# Patient Record
Sex: MALE | Race: WHITE | NOT HISPANIC OR LATINO | Employment: FULL TIME | URBAN - NONMETROPOLITAN AREA
[De-identification: names, ages, dates, MRNs, and addresses within clinical notes are randomized per-mention and may not be internally consistent; named-entity substitution may affect disease eponyms.]

---

## 2019-04-09 ENCOUNTER — HOSPITAL ENCOUNTER (EMERGENCY)
Facility: HOSPITAL | Age: 29
Discharge: HOME | End: 2019-04-10
Attending: EMERGENCY MEDICINE | Admitting: EMERGENCY MEDICINE

## 2019-04-09 ENCOUNTER — APPOINTMENT (OUTPATIENT)
Dept: CT IMAGING | Facility: HOSPITAL | Age: 29
End: 2019-04-09
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
## 2019-04-09 PROCEDURE — 72125 CT NECK SPINE W/O DYE: CPT

## 2019-04-09 PROCEDURE — 70450 CT HEAD/BRAIN W/O DYE: CPT

## 2019-04-09 PROCEDURE — 71260 CT THORAX DX C+: CPT

## 2019-04-09 PROCEDURE — 99283 EMERGENCY DEPT VISIT LOW MDM: CPT

## 2019-04-09 PROCEDURE — 25010000002 IOPAMIDOL 61 % SOLUTION: Performed by: EMERGENCY MEDICINE

## 2019-04-09 PROCEDURE — 72128 CT CHEST SPINE W/O DYE: CPT

## 2019-04-09 PROCEDURE — 74177 CT ABD & PELVIS W/CONTRAST: CPT

## 2019-04-09 RX ADMIN — IOPAMIDOL 100 ML: 612 INJECTION, SOLUTION INTRAVENOUS at 23:45

## 2019-04-10 VITALS
HEIGHT: 69 IN | WEIGHT: 280 LBS | RESPIRATION RATE: 16 BRPM | HEART RATE: 62 BPM | BODY MASS INDEX: 41.47 KG/M2 | OXYGEN SATURATION: 97 % | DIASTOLIC BLOOD PRESSURE: 61 MMHG | SYSTOLIC BLOOD PRESSURE: 128 MMHG | TEMPERATURE: 97.2 F

## 2019-04-10 PROCEDURE — 25010000002 IOPAMIDOL 61 % SOLUTION: Performed by: EMERGENCY MEDICINE

## 2019-04-10 RX ORDER — ETODOLAC 400 MG/1
400 TABLET, EXTENDED RELEASE ORAL DAILY
Qty: 10 TABLET | Refills: 0 | Status: SHIPPED | OUTPATIENT
Start: 2019-04-10 | End: 2019-04-24
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
## 2019-04-10 NOTE — ED NOTES
PT LOG ROLLED WITH STAFF AND MD SPARKS AT BEDSIDE FOR REMOVAL OF LONG SPINE BOARD.  C-COLLAR REMAINS IN PLACE.      Дмитрий Medrano RN  04/09/19 0996

## 2019-04-17 ENCOUNTER — TRANSCRIBE ORDERS (OUTPATIENT)
Dept: ADMINISTRATIVE | Facility: HOSPITAL | Age: 29
End: 2019-04-17

## 2019-04-17 DIAGNOSIS — R20.2 PARESTHESIA: ICD-10-CM

## 2019-04-17 DIAGNOSIS — M54.5 ACUTE LOW BACK PAIN, UNSPECIFIED BACK PAIN LATERALITY, WITH SCIATICA PRESENCE UNSPECIFIED: Primary | ICD-10-CM

## 2019-04-19 ENCOUNTER — HOSPITAL ENCOUNTER (OUTPATIENT)
Dept: CT IMAGING | Facility: HOSPITAL | Age: 29
Discharge: HOME | End: 2019-04-19
Admitting: PHYSICIAN ASSISTANT

## 2019-04-19 DIAGNOSIS — M54.5 ACUTE LOW BACK PAIN, UNSPECIFIED BACK PAIN LATERALITY, WITH SCIATICA PRESENCE UNSPECIFIED: ICD-10-CM

## 2019-04-19 DIAGNOSIS — R20.2 PARESTHESIA: ICD-10-CM

## 2019-04-19 PROCEDURE — 72131 CT LUMBAR SPINE W/O DYE: CPT

## 2019-05-03 ENCOUNTER — TRANSCRIBE ORDERS (OUTPATIENT)
Dept: ADMINISTRATIVE | Facility: HOSPITAL | Age: 29
End: 2019-05-03

## 2019-05-03 DIAGNOSIS — R20.2 PARESTHESIA OF SKIN: Primary | ICD-10-CM

## 2019-05-07 ENCOUNTER — HOSPITAL ENCOUNTER (OUTPATIENT)
Dept: NEUROLOGY | Facility: HOSPITAL | Age: 29
Discharge: HOME | End: 2019-05-07
Admitting: PHYSICIAN ASSISTANT

## 2019-05-07 DIAGNOSIS — R20.2 PARESTHESIA OF SKIN: ICD-10-CM

## 2019-05-07 PROCEDURE — 95886 MUSC TEST DONE W/N TEST COMP: CPT

## 2019-05-07 PROCEDURE — 95911 NRV CNDJ TEST 9-10 STUDIES: CPT

## 2019-09-03 ENCOUNTER — OFFICE VISIT (OUTPATIENT)
Dept: RETAIL CLINIC | Facility: CLINIC | Age: 29
End: 2019-09-03

## 2019-09-03 VITALS
OXYGEN SATURATION: 97 % | BODY MASS INDEX: 43.42 KG/M2 | DIASTOLIC BLOOD PRESSURE: 82 MMHG | HEART RATE: 81 BPM | TEMPERATURE: 97.9 F | SYSTOLIC BLOOD PRESSURE: 127 MMHG | WEIGHT: 294 LBS | RESPIRATION RATE: 16 BRPM

## 2019-09-03 DIAGNOSIS — M79.672 BILATERAL FOOT PAIN: ICD-10-CM

## 2019-09-03 DIAGNOSIS — M79.671 BILATERAL FOOT PAIN: ICD-10-CM

## 2019-09-03 DIAGNOSIS — L40.9 PSORIASIS: Primary | ICD-10-CM

## 2019-09-03 PROCEDURE — 99213 OFFICE O/P EST LOW 20 MIN: CPT | Performed by: NURSE PRACTITIONER

## 2019-09-03 NOTE — PROGRESS NOTES
Subjective   Jose Alejandro Edwards is a 29 y.o. male.     Acute exacerbation of rash to both feet.  Patient has psoriasis.  Hurts to stand on them for long      Rash   This is a recurrent problem. The current episode started in the past 7 days. The problem has been gradually worsening since onset. The affected locations include the left foot and right foot. The rash is characterized by dryness, pain, peeling, redness and scaling. Pertinent negatives include no fever or joint pain. Past treatments include nothing. (Psoriasis)        The following portions of the patient's history were reviewed and updated as appropriate: allergies, current medications, past family history, past medical history, past social history, past surgical history and problem list.    Review of Systems   Constitutional: Negative for chills and fever.   Musculoskeletal: Positive for gait problem. Negative for joint pain.   Skin: Positive for rash.       Objective   Physical Exam   Constitutional: He is oriented to person, place, and time. No distress.   obese   Cardiovascular: Normal rate.   Pulmonary/Chest: Effort normal.   Musculoskeletal:        Right ankle: Normal.        Left ankle: Normal.        Right foot: There is tenderness.        Left foot: There is tenderness.   Neurological: He is alert and oriented to person, place, and time. He has normal strength. No sensory deficit. Gait normal.   Skin: Rash noted.   Thick cracked scaling skin to feet bilateral, fissures to feet laterally; no active bleeding; no signs of acute infection         Assessment/Plan   Jose Alejandro was seen today for rash.    Diagnoses and all orders for this visit:    Psoriasis    Bilateral foot pain    Other orders  -     betamethasone valerate (VALISONE) 0.1 % ointment; Apply  topically to the appropriate area as directed 2 (Two) Times a Day As Needed for Rash.      Discussed appropriate care of feet. Return to see your Primary Care Provider if not improved in 1  week.    NOTE:  Patient is self pay.    JUDE Yarbrough

## 2019-09-03 NOTE — PATIENT INSTRUCTIONS
Psoriasis    Psoriasis is a long-term (chronic) condition of skin inflammation. It occurs because your immune system causes skin cells to form too quickly. As a result, too many skin cells grow and create raised, red patches (plaques) that look silvery on your skin. Plaques may appear anywhere on your body. They can be any size or shape.  Psoriasis can come and go. The condition varies from mild to very severe. It cannot be passed from one person to another (not contagious).  What are the causes?  The cause of psoriasis is not known, but certain factors can make the condition worse. These include:  · Damage or trauma to the skin, such as cuts, scrapes, sunburn, and dryness.  · Lack of sunlight.  · Certain medicines.  · Alcohol.  · Tobacco use.  · Stress.  · Infections caused by bacteria or viruses.  What increases the risk?  This condition is more likely to develop in:  · People with a family history of psoriasis.  · People who are .  · People who are between the ages of 15-30 and 50-60 years old.  What are the signs or symptoms?  There are five different types of psoriasis. You can have more than one type of psoriasis during your life. Types are:  · Plaque.  · Guttate.  · Inverse.  · Pustular.  · Erythrodermic.  Each type of psoriasis has different symptoms.  · Plaque psoriasis symptoms include red, raised plaques with a silvery white coating (scale). These plaques may be itchy. Your nails may be pitted and crumbly or fall off.  · Guttate psoriasis symptoms include small red spots that often show up on your trunk, arms, and legs. These spots may develop after you have been sick, especially with strep throat.  · Inverse psoriasis symptoms include plaques in your underarm area, under your breasts, or on your genitals, groin, or buttocks.  · Pustular psoriasis symptoms include pus-filled bumps that are painful, red, and swollen on the palms of your hands or the soles of your feet. You also may feel exhausted,  feverish, weak, or have no appetite.  · Erythrodermic psoriasis symptoms include bright red skin that may look burned. You may have a fast heartbeat and a body temperature that is too high or too low. You may be itchy or in pain.  How is this diagnosed?  Your health care provider may suspect psoriasis based on your symptoms and family history. Your health care provider will also do a physical exam. This may include a procedure to remove a tissue sample (biopsy) for testing. You may also be referred to a health care provider who specializes in skin diseases (dermatologist).  How is this treated?  There is no cure for this condition, but treatment can help manage it. Goals of treatment include:  · Helping your skin heal.  · Reducing itching and inflammation.  · Slowing the growth of new skin cells.  · Helping your immune system respond better to your skin.  Treatment varies, depending on the severity of your condition. Treatment may include:  · Creams or ointments.  · Ultraviolet ray exposure (light therapy). This may include natural sunlight or light therapy in a medical office.  · Medicines (systemic therapy). These medicines can help your body better manage skin cell turnover and inflammation. They may be used along with light therapy or ointments. You may also get antibiotic medicines if you have an infection.  Follow these instructions at home:  Skin Care  · Moisturize your skin as needed. Only use moisturizers that have been approved by your health care provider.  · Apply cool compresses to the affected areas.  · Do not scratch your skin.  Lifestyle    · Do not use tobacco products. This includes cigarettes, chewing tobacco, and e-cigarettes. If you need help quitting, ask your health care provider.  · Drink little or no alcohol.  · Try techniques for stress reduction, such as meditation or yoga.  · Get exposure to the sun as told by your health care provider. Do not get sunburned.  · Consider joining a  psoriasis support group.  Medicines  · Take or use over-the-counter and prescription medicines only as told by your health care provider.  · If you were prescribed an antibiotic, take or use it as told by your health care provider. Do not stop taking the antibiotic even if your condition starts to improve.  General instructions  · Keep a journal to help track what triggers an outbreak. Try to avoid any triggers.  · See a counselor or  if feelings of sadness, frustration, and hopelessness about your condition are interfering with your work and relationships.  · Keep all follow-up visits as told by your health care provider. This is important.  Contact a health care provider if:  · Your pain gets worse.  · You have increasing redness or warmth in the affected areas.  · You have new or worsening pain or stiffness in your joints.  · Your nails start to break easily or pull away from the nail bed.  · You have a fever.  · You feel depressed.  This information is not intended to replace advice given to you by your health care provider. Make sure you discuss any questions you have with your health care provider.  Document Released: 12/15/2001 Document Revised: 05/25/2017 Document Reviewed: 05/04/2016  Elsevier Interactive Patient Education © 2019 [employer]

## 2020-09-12 PROCEDURE — U0003 INFECTIOUS AGENT DETECTION BY NUCLEIC ACID (DNA OR RNA); SEVERE ACUTE RESPIRATORY SYNDROME CORONAVIRUS 2 (SARS-COV-2) (CORONAVIRUS DISEASE [COVID-19]), AMPLIFIED PROBE TECHNIQUE, MAKING USE OF HIGH THROUGHPUT TECHNOLOGIES AS DESCRIBED BY CMS-2020-01-R: HCPCS | Performed by: NURSE PRACTITIONER

## 2020-09-14 ENCOUNTER — TELEPHONE (OUTPATIENT)
Dept: URGENT CARE | Facility: CLINIC | Age: 30
End: 2020-09-14

## 2020-09-14 NOTE — TELEPHONE ENCOUNTER
Spoke with patient to discuss positive Covid-19 test results. He reports little to no symptoms at this time. Agree to quarantine for discussed period of time.

## 2021-02-05 ENCOUNTER — OFFICE VISIT (OUTPATIENT)
Dept: NEUROLOGY | Facility: CLINIC | Age: 31
End: 2021-02-05

## 2021-02-05 VITALS
BODY MASS INDEX: 45.1 KG/M2 | HEIGHT: 70 IN | WEIGHT: 315 LBS | HEART RATE: 74 BPM | SYSTOLIC BLOOD PRESSURE: 132 MMHG | RESPIRATION RATE: 20 BRPM | DIASTOLIC BLOOD PRESSURE: 80 MMHG

## 2021-02-05 DIAGNOSIS — G47.9 SLEEP DISTURBANCE: Primary | ICD-10-CM

## 2021-02-05 DIAGNOSIS — R06.83 SNORING: ICD-10-CM

## 2021-02-05 PROCEDURE — 99203 OFFICE O/P NEW LOW 30 MIN: CPT | Performed by: PSYCHIATRY & NEUROLOGY

## 2021-02-05 NOTE — PROGRESS NOTES
HISTORY: Patient seen for history of excessive daytime sleepiness and fatigue.  Patient snores significantly.  Patient wakes up gasping and has some morning headaches.  Patient has awaken with dry mouth and been told he quits breathing at night.  Patient is awake and gasping for breath.  Patient has trouble breathing through his nose at night.  Patient has periods where he seems to feel paralyzed from going to sleep.  Patient been told he moves/jerks during sleep and has problems initiating sleep at night and pain that bothers him at night.  Patient talks in his sleep and acts out his dreams.  Patient has excessive sweating during sleep.  Patient has gained some 70 pounds in the last year.  Patient goes to bed at 10 PM awakens at 7:30 AM.  Patient sometimes takes hours to fall asleep.  Patient on no medication.  Patient previously was using methamphetamines but at 2 years clean that.  Patient working in the rehab now.  Patient denies any underlying heart problems or lung problems.  Patient had previous sleep study at [facility] several years ago and that report is not available for review.  Patient never did get the results or follow-up on that.    EXAM: Patient's BMI is 46.2.  [place] Sleepiness Scale is 16.  Blood pressure to 132/80 left arm standing 124/72 left arm seated with pulse 74.  Neck circumference 20 inches.  Stop bang as 6.  No bruits/no lymphadenopathy.  No acute oropharynx abnormalities.  Mallampati 4.  No acute cardiopulmonary abnormalities by auscultation.  Abdomen nondistended with no liver edge or spleen edge felt.  No acute fundic abnormalities.  Tympanic membrane on the left obscured by wax.    IMPRESSION: Patient with probable sleep apnea and checking overnight continuous oximetry on room air as baseline plus either home study or in lab polysomnogram.  Patient to have safety and driving precautions as discussed.  Patient to be careful about climbing or using sharp cutting tools or working around  hot fire/stove/grill/water.  I spent 30 minutes with this patient counseling and reviewing records and exam.Patient's Body mass index is 46.2 kg/m². BMI is above normal parameters. Recommendations include: referral to primary care.

## 2021-02-05 NOTE — PATIENT INSTRUCTIONS
Patient to continue driving and safety precautions as discussed.  Patient to get with PCP evaluate diet control

## 2021-02-26 ENCOUNTER — TELEPHONE (OUTPATIENT)
Dept: NEUROLOGY | Facility: CLINIC | Age: 31
End: 2021-02-26

## 2021-03-01 NOTE — TELEPHONE ENCOUNTER
DELETE AFTER REVIEWING: Telephone encounter to be sent to the clinical pool   Hub staff attempted to follow warm transfer process and was unsuccessful     Caller: TERRY BARRAGAN    Relationship to patient: SELF    Best call back number: [phone]    Patient is needing: TO RETURN YOLI'S PHONE CALL. UNABLE TO WARM TRANSFER. PLEASE CALL BACK AT EARLIEST CONVENIENCE.        DELETE AFTER READING TO PATIENT: “I was unable to reach my scheduling contact. I will send a message to the scheduling team. Please allow 48 hours for the  staff to follow up on this request.”

## 2021-03-04 ENCOUNTER — DOCUMENTATION (OUTPATIENT)
Dept: NEUROLOGY | Facility: CLINIC | Age: 31
End: 2021-03-04

## 2021-03-04 NOTE — PROGRESS NOTES
Patient's overnight continuous oximetry came back at 26 minutes and 15 seconds less than or equal to 89% oxygen saturation 20 minutes and 43 seconds less than or equal to 88% oxygen saturation with lowest being 74%.  Patient HERBER was 15.3.  Lowest pulse 45 bpm.  Patient is to be started on oxygen at 1 L/min with overnight continuous oximetry on the oxygen to be done.  Patient sleep study will be changed from home sleep study to in lab be sleep study.

## 2021-03-04 NOTE — TELEPHONE ENCOUNTER
TERRY BARRAGAN  [phone]    THE PT IS WANTING TO KNOW WHAT HIS NEXT STEP SHOULD BE IN ORDER TO GET HIS CPAP MACHINE. PLEASE GIVE HIM A CALL BACK WHEN POSSIBLE.

## 2021-03-05 ENCOUNTER — TELEPHONE (OUTPATIENT)
Dept: NEUROLOGY | Facility: CLINIC | Age: 31
End: 2021-03-05

## 2021-03-05 ENCOUNTER — DOCUMENTATION (OUTPATIENT)
Dept: NEUROLOGY | Facility: CLINIC | Age: 31
End: 2021-03-05

## 2021-03-05 NOTE — TELEPHONE ENCOUNTER
----- Message from Edward Enamorado MD sent at 3/5/2021  3:13 PM CST -----  Contact DME to fax them the note that I dictated about the patient having hypoxia and contact the patient to note to the patient that patient is being scheduled for his in lab sleep study  ----- Message -----  From: Crissy Martini LPN  Sent: 3/5/2021   3:05 PM CST  To: Edward Enamorado MD    [facility] with [facility] said Jose Alejandro needs a diagnosis of hypoxia before insurance will cover the oxygen.

## 2021-03-05 NOTE — PROGRESS NOTES
Patient has the diagnosis of hypoxia and is going to be started on nasal oxygen by nasal cannula nightly

## 2021-03-08 DIAGNOSIS — R06.83 SNORING: ICD-10-CM

## 2021-03-08 DIAGNOSIS — G47.9 SLEEP DISTURBANCE: Primary | ICD-10-CM

## 2021-03-08 DIAGNOSIS — G47.30 SLEEP APNEA, UNSPECIFIED TYPE: ICD-10-CM

## 2021-06-21 ENCOUNTER — HOSPITAL ENCOUNTER (OUTPATIENT)
Dept: SLEEP MEDICINE | Facility: HOSPITAL | Age: 31
Discharge: HOME | End: 2021-06-21
Admitting: PSYCHIATRY & NEUROLOGY

## 2021-06-21 VITALS
WEIGHT: 315 LBS | HEART RATE: 80 BPM | RESPIRATION RATE: 16 BRPM | BODY MASS INDEX: 45.1 KG/M2 | HEIGHT: 70 IN | OXYGEN SATURATION: 93 % | DIASTOLIC BLOOD PRESSURE: 75 MMHG | SYSTOLIC BLOOD PRESSURE: 124 MMHG

## 2021-06-21 DIAGNOSIS — R06.83 SNORING: ICD-10-CM

## 2021-06-21 DIAGNOSIS — G47.9 SLEEP DISTURBANCE: ICD-10-CM

## 2021-06-21 DIAGNOSIS — G47.30 SLEEP APNEA, UNSPECIFIED TYPE: ICD-10-CM

## 2021-06-21 PROCEDURE — 95810 POLYSOM 6/> YRS 4/> PARAM: CPT | Performed by: PSYCHIATRY & NEUROLOGY

## 2021-06-21 PROCEDURE — 95810 POLYSOM 6/> YRS 4/> PARAM: CPT

## 2021-06-24 ENCOUNTER — TRANSCRIBE ORDERS (OUTPATIENT)
Dept: SLEEP MEDICINE | Facility: HOSPITAL | Age: 31
End: 2021-06-24

## 2021-06-24 ENCOUNTER — TELEPHONE (OUTPATIENT)
Dept: NEUROLOGY | Facility: HOSPITAL | Age: 31
End: 2021-06-24

## 2021-06-24 DIAGNOSIS — G47.33 OBSTRUCTIVE SLEEP APNEA, ADULT: Primary | ICD-10-CM

## 2021-06-24 NOTE — TELEPHONE ENCOUNTER
Spoke to Mr. Edwards and went over the results of his polysomnogram performed 06/21/2021.  Questions answered.  Dr. Edward Enamordao wrote an order for Mr. Edwards to be scheduled for a CPAP trial.  The sleep center will call to schedule.  Mr. Edwards understands the sleep center is booking in-lab studies out in August.

## 2021-07-01 ENCOUNTER — TRANSCRIBE ORDERS (OUTPATIENT)
Dept: LAB | Facility: HOSPITAL | Age: 31
End: 2021-07-01

## 2021-07-01 DIAGNOSIS — Z01.818 PREOPERATIVE TESTING: Primary | ICD-10-CM

## 2021-07-05 ENCOUNTER — LAB (OUTPATIENT)
Dept: LAB | Facility: HOSPITAL | Age: 31
End: 2021-07-05

## 2021-07-05 LAB — SARS-COV-2 ORF1AB RESP QL NAA+PROBE: NOT DETECTED

## 2021-07-05 PROCEDURE — U0004 COV-19 TEST NON-CDC HGH THRU: HCPCS | Performed by: PSYCHIATRY & NEUROLOGY

## 2021-07-05 PROCEDURE — C9803 HOPD COVID-19 SPEC COLLECT: HCPCS | Performed by: PSYCHIATRY & NEUROLOGY

## 2021-07-06 ENCOUNTER — HOSPITAL ENCOUNTER (OUTPATIENT)
Dept: SLEEP MEDICINE | Facility: HOSPITAL | Age: 31
Discharge: HOME | End: 2021-07-06

## 2021-07-15 ENCOUNTER — TRANSCRIBE ORDERS (OUTPATIENT)
Dept: LAB | Facility: HOSPITAL | Age: 31
End: 2021-07-15

## 2021-07-15 DIAGNOSIS — Z01.818 PREOPERATIVE TESTING: Primary | ICD-10-CM

## 2021-07-16 ENCOUNTER — LAB (OUTPATIENT)
Dept: LAB | Facility: HOSPITAL | Age: 31
End: 2021-07-16

## 2021-07-16 LAB — SARS-COV-2 ORF1AB RESP QL NAA+PROBE: NOT DETECTED

## 2021-07-16 PROCEDURE — U0004 COV-19 TEST NON-CDC HGH THRU: HCPCS | Performed by: PSYCHIATRY & NEUROLOGY

## 2021-07-16 PROCEDURE — C9803 HOPD COVID-19 SPEC COLLECT: HCPCS | Performed by: PSYCHIATRY & NEUROLOGY

## 2021-07-19 ENCOUNTER — HOSPITAL ENCOUNTER (OUTPATIENT)
Dept: SLEEP MEDICINE | Facility: HOSPITAL | Age: 31
Discharge: HOME | End: 2021-07-19
Admitting: PSYCHIATRY & NEUROLOGY

## 2021-07-19 VITALS
HEIGHT: 70 IN | OXYGEN SATURATION: 97 % | RESPIRATION RATE: 16 BRPM | DIASTOLIC BLOOD PRESSURE: 72 MMHG | HEART RATE: 93 BPM | WEIGHT: 315 LBS | BODY MASS INDEX: 45.1 KG/M2 | SYSTOLIC BLOOD PRESSURE: 123 MMHG

## 2021-07-19 DIAGNOSIS — G47.33 OBSTRUCTIVE SLEEP APNEA, ADULT: ICD-10-CM

## 2021-07-19 PROCEDURE — 95811 POLYSOM 6/>YRS CPAP 4/> PARM: CPT

## 2021-07-19 PROCEDURE — 95811 POLYSOM 6/>YRS CPAP 4/> PARM: CPT | Performed by: PSYCHIATRY & NEUROLOGY

## 2021-07-22 ENCOUNTER — TELEPHONE (OUTPATIENT)
Dept: NEUROLOGY | Facility: CLINIC | Age: 31
End: 2021-07-22

## 2021-07-22 NOTE — TELEPHONE ENCOUNTER
Provider: KENN  Caller: PT  Relationship to Patient: SELF  Pharmacy: N/A  Phone Number: [phone]  Reason for Call: PT TEL RE: CPAP MACHINE; WHEN SHOULD HE EXPECT TO GET IT AS HE COMPLETED SLEEP STUDY ON 7/19/21.    PLEASE CALL PT & ADVISE.    THANK YOU.

## 2021-07-22 NOTE — TELEPHONE ENCOUNTER
ADVISED PT OF THE LENGTH OF TIME FOR STUDY TO BE COMPLETED AS WELL AS LETTING HIM KNOW HE WILL BE CONTACTED RE: CPAP MACHINE.    PT UNDERSTOOD.

## 2021-07-30 ENCOUNTER — TELEPHONE (OUTPATIENT)
Dept: NEUROLOGY | Facility: HOSPITAL | Age: 31
End: 2021-07-30

## 2021-07-30 NOTE — TELEPHONE ENCOUNTER
Spoke to Mr. Edwards and went over the results of his CPAP trial performed 07/19/2021.  Questions answered.  Mr Edwards received his CPAP from [facility].  Mr. Edwards wore his unit for the first time last night.  Per Mr. Edwards, he used the unit about 5 hours. Mr. Edwards has a compliance appointment with Dr. Enamorado on October 21st, 2021 at 11AM.

## 2021-08-29 PROCEDURE — U0004 COV-19 TEST NON-CDC HGH THRU: HCPCS | Performed by: NURSE PRACTITIONER

## 2021-10-21 ENCOUNTER — OFFICE VISIT (OUTPATIENT)
Dept: NEUROLOGY | Facility: CLINIC | Age: 31
End: 2021-10-21

## 2021-10-21 VITALS
OXYGEN SATURATION: 95 % | BODY MASS INDEX: 46.65 KG/M2 | SYSTOLIC BLOOD PRESSURE: 126 MMHG | HEART RATE: 89 BPM | DIASTOLIC BLOOD PRESSURE: 76 MMHG | HEIGHT: 69 IN | WEIGHT: 315 LBS

## 2021-10-21 DIAGNOSIS — G47.33 OBSTRUCTIVE SLEEP APNEA: Primary | ICD-10-CM

## 2021-10-21 PROCEDURE — 99213 OFFICE O/P EST LOW 20 MIN: CPT | Performed by: PSYCHIATRY & NEUROLOGY

## 2021-10-21 NOTE — PROGRESS NOTES
HISTORY: Patient with HARSH on CPAP and 10 cm water pressure.  Patient uses a nasal mask and [facility].  He is in compliance with 70% of the time greater than equal to 4 hours averaging about 5 hours and a half.  Minimal leak with his mask/ patient did not require oxygen supplementation.  Stop bang is 3.  [place] Sleepiness Scale 9.  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  Again I reviewed safety and driving precautions.    EXAM: Patient's BMI is 49.09.  Neck circumference is 19.5 inches.  Blood pressure today 122/82 left arm seated with pulse 83 and standing 126/76 with pulse 89.  No acute oropharynx abnormalities.  Mallampati 3.  No bruits/no lymphadenopathy.  Abdomen nondistended and no liver edge or spleen is felt.  No acute cardiopulmonary abnormalities by auscultation.  No acute finding abnormalities noted.  Patient denies any headaches.  Tympanic membranes are normal    IMPRESSION: HARSH stable with [employer]Med device.  Patient to get with PCP about weight and diet control with elevated BMI.  I spent 20 minutes with this patient with review of records and exam and counseling.

## 2021-10-21 NOTE — PATIENT INSTRUCTIONS
Continue safety and driving precautions as previously discussed.  Patient to get with PCP about weight and diet control

## 2022-01-11 PROCEDURE — U0004 COV-19 TEST NON-CDC HGH THRU: HCPCS | Performed by: NURSE PRACTITIONER

## 2023-01-30 ENCOUNTER — OFFICE VISIT (OUTPATIENT)
Dept: NEUROLOGY | Facility: CLINIC | Age: 33
End: 2023-01-30
Payer: COMMERCIAL

## 2023-01-30 VITALS
HEART RATE: 88 BPM | DIASTOLIC BLOOD PRESSURE: 88 MMHG | SYSTOLIC BLOOD PRESSURE: 140 MMHG | WEIGHT: 315 LBS | OXYGEN SATURATION: 98 % | BODY MASS INDEX: 46.65 KG/M2 | HEIGHT: 69 IN

## 2023-01-30 DIAGNOSIS — G47.33 OSA (OBSTRUCTIVE SLEEP APNEA): Primary | ICD-10-CM

## 2023-01-30 PROCEDURE — 99213 OFFICE O/P EST LOW 20 MIN: CPT | Performed by: PSYCHIATRY & NEUROLOGY

## 2023-01-30 NOTE — PATIENT INSTRUCTIONS
Patient to continue driving and safety precautions as discussed.  Patient to get with PCP about weight and diet control with elevated BMI

## 2023-01-30 NOTE — PROGRESS NOTES
HISTORY: Patient is here for yearly visit for sleep follow-up.  His original AHI was 55 with hypoxemia.  Patient titrated to a CPAP of 10 and he is 100% compliant wearing it more than 8.5 hours a night.  His AHI is 1.4.  He is mask leak is minimal.  He uses [facility] as his DME.  He has a nasal mask.  He says he does not take any medications at this time.  Apparently did use methamphetamines at one point but not now.  Patient is not a smoker now and no alcohol use.  Patient follows with [facility] at [phone].  Apparently she follows blood work.  He has a STOP-BANG of 3 with neck circumference of 21 inches.  He has an [place] Sleepiness Scale of 14.  His previous [place] Sleepiness Scale was 9.  Patient had COVID late 2021 or possibly early 2022.  He says it he did not have any significant residual.  Patient says he is going to ask his PCP about possible referral for gastric sleeve.  Patient denies any problems driving.  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    EXAM: Blood pressure today 138/80 sitting with pulse of 83 and standing blood pressure 140/88 with pulse of 88.  BMI is 48.73.  Patient apparently has gained about 10 pounds since his original procedure.  Patient has no acute fundic abnormalities.  No tympanic membrane abnormalities.  No acute oropharynx abnormalities.  Mallampati 3.  No bruits/no lymphadenopathy.  No acute cardiopulmonary abnormalities by auscultation.    IMPRESSION: Patient has excellent compliance and low AHI but continues to have high [place] Sleepiness Scale.  I would like to review his blood work as a baseline.  Also want to have [facility] do an overnight continuous oximetry on the Pap device and have them check it also to make sure it is working properly.  Patient to get with PCP about weight and diet control with elevated BMI.  Patient to continue driving and safety precautions.  Patient to have caution climbing/ using sharp cutting tools and  working around hot fire/stove/grill/water.  I spent 20 minutes with this patient with counseling exam and review of records.  Patient might be a candidate for Provigil or Sunosi depending on the decision of the decision on surgery for gastric sleeve

## 2023-02-09 ENCOUNTER — TELEPHONE (OUTPATIENT)
Dept: NEUROLOGY | Facility: CLINIC | Age: 33
End: 2023-02-09
Payer: COMMERCIAL

## 2023-02-09 DIAGNOSIS — G47.33 OBSTRUCTIVE SLEEP APNEA: Primary | ICD-10-CM

## 2023-02-09 NOTE — TELEPHONE ENCOUNTER
PATIENTS WIFE CALLING TODAY TO ADVISE THAT [facility] NEEDS THE LATEST OV NOTES AND A PRESCRIPTION FOR CPAP AND SUPPLIES    PLEASE SEND TO [facility]    THANK YOU

## 2023-04-24 ENCOUNTER — TELEPHONE (OUTPATIENT)
Dept: NEUROLOGY | Facility: CLINIC | Age: 33
End: 2023-04-24
Payer: COMMERCIAL

## 2023-04-24 NOTE — TELEPHONE ENCOUNTER
Provider:BING  Caller: TERRY  Phone Number:[phone]  Reason for Call: PT CALLED AND IS NEEDING TO RESCHEDULE HIS APPT WITH  ON 04/28/23 . UNABLE TO RESCHEDULE PT. PLEASE ADVISE ON FOLLOW UP SLEEP APPT.    PLEASE REVIEW THANK YOU

## 2023-05-05 ENCOUNTER — DOCUMENTATION (OUTPATIENT)
Dept: NEUROLOGY | Facility: CLINIC | Age: 33
End: 2023-05-05
Payer: COMMERCIAL

## 2023-05-05 NOTE — PROGRESS NOTES
I had to leave a message for the patient.  I did remind him he has an appointment on May 9th at 2 PM with ..

## 2023-05-09 ENCOUNTER — OFFICE VISIT (OUTPATIENT)
Dept: NEUROLOGY | Facility: CLINIC | Age: 33
End: 2023-05-09
Payer: COMMERCIAL

## 2023-05-09 VITALS
HEART RATE: 92 BPM | BODY MASS INDEX: 46.65 KG/M2 | RESPIRATION RATE: 20 BRPM | SYSTOLIC BLOOD PRESSURE: 118 MMHG | WEIGHT: 315 LBS | HEIGHT: 69 IN | DIASTOLIC BLOOD PRESSURE: 78 MMHG

## 2023-05-09 DIAGNOSIS — G47.33 OSA ON CPAP: Primary | ICD-10-CM

## 2023-05-09 DIAGNOSIS — Z99.89 OSA ON CPAP: Primary | ICD-10-CM

## 2023-05-09 PROCEDURE — 99213 OFFICE O/P EST LOW 20 MIN: CPT | Performed by: PSYCHIATRY & NEUROLOGY

## 2023-05-09 PROCEDURE — 1160F RVW MEDS BY RX/DR IN RCRD: CPT | Performed by: PSYCHIATRY & NEUROLOGY

## 2023-05-09 PROCEDURE — 1159F MED LIST DOCD IN RCRD: CPT | Performed by: PSYCHIATRY & NEUROLOGY

## 2023-05-09 NOTE — PATIENT INSTRUCTIONS
Patient to get with PCP about elevated BMI and diet and weight control.  Patient to continue driving and safety precautions as discussed.

## 2023-05-09 NOTE — PROGRESS NOTES
HISTORY: Patient with history of severe HARSH with original AHI above 50.  Patient was significantly desaturating as well but was started on CPAP in July 2021.  Patient on 10 cm water pressure.  Patient is 100% compliant using it almost 9 hours per night.  He has a nasal mask through [facility] which is comfortable.  Minimal mask leak.  AHI 1.3.  Patient says he feels much better using the device and cannot go without them.  Patient works at [employer].  Patient's overnight oximetry on the Pap device is min 0 minutes less than or equal to 89 or 88%.  Patient is not using his a [place] fire as it caused water in his nose.    EXAM: Patient's blood pressure 120/80 left arm seated  118/78 left arm standing with pulse 92.  BMI is 53.02.  Neck circumference is 22 inches.  No acute oropharynx abnormalities.  Mallampati 3.  No acute cardiopulmonary abnormalities by auscultation.  No bruits/no lymphadenopathy.  Tympanic membranes are normal.  No acute fundic abnormalities.  Abdomen nondistended with no liver edge or spleen edge felt.    IMPRESSION: Patient with severe obstructive sleep apnea.  Patient stable at this point.  Patient to continue yearly follow-up with [facility] sleep clinic.  Patient has STOP-BANG of 4.  He says that he feels tired after hard day at work but not during work or driving.  Patient has an [place] Sleepiness Scale of 7.  I spent 20 minutes with this patient with counseling/ exam and review of records.  Patient to get with PCP about weight and diet control with elevated BMI.  Patient to continue driving and safety precautions as discussed.

## 2023-12-31 ENCOUNTER — HOSPITAL ENCOUNTER (EMERGENCY)
Facility: HOSPITAL | Age: 33
Discharge: HOME | End: 2023-12-31
Attending: EMERGENCY MEDICINE | Admitting: EMERGENCY MEDICINE
Payer: COMMERCIAL

## 2023-12-31 ENCOUNTER — APPOINTMENT (OUTPATIENT)
Dept: CT IMAGING | Facility: HOSPITAL | Age: 33
End: 2023-12-31
Payer: COMMERCIAL

## 2023-12-31 VITALS
WEIGHT: 315 LBS | TEMPERATURE: 97.7 F | OXYGEN SATURATION: 99 % | SYSTOLIC BLOOD PRESSURE: 142 MMHG | HEART RATE: 81 BPM | DIASTOLIC BLOOD PRESSURE: 84 MMHG | RESPIRATION RATE: 20 BRPM | BODY MASS INDEX: 46.65 KG/M2 | HEIGHT: 69 IN

## 2023-12-31 DIAGNOSIS — N20.0 KIDNEY STONE: Primary | ICD-10-CM

## 2023-12-31 DIAGNOSIS — R16.1 SPLENOMEGALY: ICD-10-CM

## 2023-12-31 LAB
ALBUMIN SERPL-MCNC: 4.2 G/DL (ref 3.5–5.2)
ALBUMIN/GLOB SERPL: 1 G/DL
ALP SERPL-CCNC: 78 U/L (ref 39–117)
ALT SERPL W P-5'-P-CCNC: 21 U/L (ref 1–41)
ANION GAP SERPL CALCULATED.3IONS-SCNC: 10 MMOL/L (ref 5–15)
AST SERPL-CCNC: 17 U/L (ref 1–40)
BACTERIA UR QL AUTO: ABNORMAL /HPF
BASOPHILS # BLD AUTO: 0.06 10*3/MM3 (ref 0–0.2)
BASOPHILS NFR BLD AUTO: 0.6 % (ref 0–1.5)
BILIRUB SERPL-MCNC: 0.4 MG/DL (ref 0–1.2)
BILIRUB UR QL STRIP: NEGATIVE
BUN SERPL-MCNC: 8 MG/DL (ref 6–20)
BUN/CREAT SERPL: 10.7 (ref 7–25)
CALCIUM SPEC-SCNC: 9.1 MG/DL (ref 8.6–10.5)
CHLORIDE SERPL-SCNC: 100 MMOL/L (ref 98–107)
CLARITY UR: CLEAR
CO2 SERPL-SCNC: 27 MMOL/L (ref 22–29)
COLOR UR: YELLOW
CREAT SERPL-MCNC: 0.75 MG/DL (ref 0.76–1.27)
DEPRECATED RDW RBC AUTO: 39.9 FL (ref 37–54)
EGFRCR SERPLBLD CKD-EPI 2021: 123.7 ML/MIN/1.73
EOSINOPHIL # BLD AUTO: 0.24 10*3/MM3 (ref 0–0.4)
EOSINOPHIL NFR BLD AUTO: 2.2 % (ref 0.3–6.2)
ERYTHROCYTE [DISTWIDTH] IN BLOOD BY AUTOMATED COUNT: 13.7 % (ref 12.3–15.4)
GLOBULIN UR ELPH-MCNC: 4.2 GM/DL
GLUCOSE SERPL-MCNC: 122 MG/DL (ref 65–99)
GLUCOSE UR STRIP-MCNC: NEGATIVE MG/DL
HCT VFR BLD AUTO: 42.4 % (ref 37.5–51)
HGB BLD-MCNC: 13.8 G/DL (ref 13–17.7)
HGB UR QL STRIP.AUTO: ABNORMAL
HYALINE CASTS UR QL AUTO: ABNORMAL /LPF
IMM GRANULOCYTES # BLD AUTO: 0.04 10*3/MM3 (ref 0–0.05)
IMM GRANULOCYTES NFR BLD AUTO: 0.4 % (ref 0–0.5)
KETONES UR QL STRIP: ABNORMAL
LEUKOCYTE ESTERASE UR QL STRIP.AUTO: ABNORMAL
LIPASE SERPL-CCNC: 25 U/L (ref 13–60)
LYMPHOCYTES # BLD AUTO: 2.3 10*3/MM3 (ref 0.7–3.1)
LYMPHOCYTES NFR BLD AUTO: 21.5 % (ref 19.6–45.3)
MCH RBC QN AUTO: 26.2 PG (ref 26.6–33)
MCHC RBC AUTO-ENTMCNC: 32.5 G/DL (ref 31.5–35.7)
MCV RBC AUTO: 80.6 FL (ref 79–97)
MONOCYTES # BLD AUTO: 0.5 10*3/MM3 (ref 0.1–0.9)
MONOCYTES NFR BLD AUTO: 4.7 % (ref 5–12)
NEUTROPHILS NFR BLD AUTO: 7.58 10*3/MM3 (ref 1.7–7)
NEUTROPHILS NFR BLD AUTO: 70.6 % (ref 42.7–76)
NITRITE UR QL STRIP: NEGATIVE
NRBC BLD AUTO-RTO: 0 /100 WBC (ref 0–0.2)
PH UR STRIP.AUTO: 5.5 [PH] (ref 5–8)
PLATELET # BLD AUTO: 367 10*3/MM3 (ref 140–450)
PMV BLD AUTO: 8.5 FL (ref 6–12)
POTASSIUM SERPL-SCNC: 3.7 MMOL/L (ref 3.5–5.2)
PROT SERPL-MCNC: 8.4 G/DL (ref 6–8.5)
PROT UR QL STRIP: ABNORMAL
RBC # BLD AUTO: 5.26 10*6/MM3 (ref 4.14–5.8)
RBC # UR STRIP: ABNORMAL /HPF
REF LAB TEST METHOD: ABNORMAL
SODIUM SERPL-SCNC: 137 MMOL/L (ref 136–145)
SP GR UR STRIP: 1.02 (ref 1–1.03)
SQUAMOUS #/AREA URNS HPF: ABNORMAL /HPF
UROBILINOGEN UR QL STRIP: ABNORMAL
WBC # UR STRIP: ABNORMAL /HPF
WBC NRBC COR # BLD AUTO: 10.72 10*3/MM3 (ref 3.4–10.8)

## 2023-12-31 PROCEDURE — 96375 TX/PRO/DX INJ NEW DRUG ADDON: CPT

## 2023-12-31 PROCEDURE — 80053 COMPREHEN METABOLIC PANEL: CPT | Performed by: EMERGENCY MEDICINE

## 2023-12-31 PROCEDURE — 83690 ASSAY OF LIPASE: CPT | Performed by: EMERGENCY MEDICINE

## 2023-12-31 PROCEDURE — 0 HYDROMORPHONE 1 MG/ML SOLUTION: Performed by: EMERGENCY MEDICINE

## 2023-12-31 PROCEDURE — 25010000002 KETOROLAC TROMETHAMINE PER 15 MG: Performed by: EMERGENCY MEDICINE

## 2023-12-31 PROCEDURE — 85025 COMPLETE CBC W/AUTO DIFF WBC: CPT | Performed by: EMERGENCY MEDICINE

## 2023-12-31 PROCEDURE — 74176 CT ABD & PELVIS W/O CONTRAST: CPT

## 2023-12-31 PROCEDURE — 25010000002 ONDANSETRON PER 1 MG: Performed by: EMERGENCY MEDICINE

## 2023-12-31 PROCEDURE — 96376 TX/PRO/DX INJ SAME DRUG ADON: CPT

## 2023-12-31 PROCEDURE — 25810000003 SODIUM CHLORIDE 0.9 % SOLUTION: Performed by: EMERGENCY MEDICINE

## 2023-12-31 PROCEDURE — 96374 THER/PROPH/DIAG INJ IV PUSH: CPT

## 2023-12-31 PROCEDURE — 99284 EMERGENCY DEPT VISIT MOD MDM: CPT

## 2023-12-31 PROCEDURE — 81001 URINALYSIS AUTO W/SCOPE: CPT | Performed by: EMERGENCY MEDICINE

## 2023-12-31 RX ORDER — KETOROLAC TROMETHAMINE 30 MG/ML
30 INJECTION, SOLUTION INTRAMUSCULAR; INTRAVENOUS ONCE
Status: COMPLETED | OUTPATIENT
Start: 2023-12-31 | End: 2023-12-31

## 2023-12-31 RX ORDER — OXYCODONE HYDROCHLORIDE AND ACETAMINOPHEN 5; 325 MG/1; MG/1
1 TABLET ORAL EVERY 8 HOURS PRN
Qty: 12 TABLET | Refills: 0 | Status: SHIPPED | OUTPATIENT
Start: 2023-12-31

## 2023-12-31 RX ORDER — ONDANSETRON 2 MG/ML
4 INJECTION INTRAMUSCULAR; INTRAVENOUS ONCE
Status: COMPLETED | OUTPATIENT
Start: 2023-12-31 | End: 2023-12-31

## 2023-12-31 RX ORDER — TAMSULOSIN HYDROCHLORIDE 0.4 MG/1
1 CAPSULE ORAL NIGHTLY
Qty: 14 CAPSULE | Refills: 0 | Status: SHIPPED | OUTPATIENT
Start: 2023-12-31

## 2023-12-31 RX ORDER — ONDANSETRON 4 MG/1
4 TABLET, ORALLY DISINTEGRATING ORAL EVERY 8 HOURS PRN
Qty: 12 TABLET | Refills: 0 | Status: SHIPPED | OUTPATIENT
Start: 2023-12-31

## 2023-12-31 RX ORDER — SODIUM CHLORIDE 0.9 % (FLUSH) 0.9 %
10 SYRINGE (ML) INJECTION AS NEEDED
Status: DISCONTINUED | OUTPATIENT
Start: 2023-12-31 | End: 2023-12-31 | Stop reason: HOSPADM

## 2023-12-31 RX ADMIN — KETOROLAC TROMETHAMINE 30 MG: 30 INJECTION, SOLUTION INTRAMUSCULAR; INTRAVENOUS at 11:00

## 2023-12-31 RX ADMIN — HYDROMORPHONE HYDROCHLORIDE 1 MG: 1 INJECTION, SOLUTION INTRAMUSCULAR; INTRAVENOUS; SUBCUTANEOUS at 10:58

## 2023-12-31 RX ADMIN — SODIUM CHLORIDE 1000 ML: 9 INJECTION, SOLUTION INTRAVENOUS at 10:58

## 2023-12-31 RX ADMIN — HYDROMORPHONE HYDROCHLORIDE 1 MG: 1 INJECTION, SOLUTION INTRAMUSCULAR; INTRAVENOUS; SUBCUTANEOUS at 12:56

## 2023-12-31 RX ADMIN — ONDANSETRON 4 MG: 2 INJECTION INTRAMUSCULAR; INTRAVENOUS at 11:00

## 2023-12-31 NOTE — ED PROVIDER NOTES
Subjective   History of Present Illness  Patient is a 33-year-old male with no significant past medical history who presents to the ER with abdominal pain.  Patient states he has had sharp right lower quadrant abdominal pain since this morning.  Patient states the pain was very sudden in onset.  He has had associated nausea and vomiting.  He has never had pain like this previously.  Nothing really makes his symptoms better or worse.  He denies any fever, chest pain, shortness of air, diarrhea, urinary changes, neurologic changes.      Review of Systems   Constitutional: Negative.    HENT: Negative.     Eyes: Negative.    Respiratory: Negative.     Cardiovascular: Negative.    Gastrointestinal:  Positive for abdominal pain, nausea and vomiting.   Endocrine: Negative.    Genitourinary: Negative.    Musculoskeletal: Negative.    Skin: Negative.    Allergic/Immunologic: Negative.    Neurological: Negative.    Hematological: Negative.    Psychiatric/Behavioral: Negative.     All other systems reviewed and are negative.      Past Medical History:   Diagnosis Date    Psoriasis        No Known Allergies    Past Surgical History:   Procedure Laterality Date    HERNIA REPAIR      TYMPANOSTOMY TUBE PLACEMENT         No family history on file.    Social History     Socioeconomic History    Marital status:    Tobacco Use    Smoking status: Former    Smokeless tobacco: Never   Vaping Use    Vaping Use: Never used   Substance and Sexual Activity    Alcohol use: No    Drug use: Not Currently     Types: Methamphetamines     Comment: IN DRUG COURT  past    Sexual activity: Defer           Objective   Physical Exam  Vitals and nursing note reviewed.   Constitutional:       Appearance: He is well-developed.   HENT:      Head: Normocephalic and atraumatic.   Eyes:      Conjunctiva/sclera: Conjunctivae normal.      Pupils: Pupils are equal, round, and reactive to light.   Cardiovascular:      Rate and Rhythm: Normal rate and  regular rhythm.      Heart sounds: Normal heart sounds.   Pulmonary:      Effort: Pulmonary effort is normal.      Breath sounds: Normal breath sounds.   Abdominal:      Palpations: Abdomen is soft.      Tenderness: There is abdominal tenderness in the right lower quadrant. There is no right CVA tenderness, left CVA tenderness, guarding or rebound.   Musculoskeletal:         General: No deformity. Normal range of motion.      Cervical back: Normal range of motion.   Skin:     General: Skin is warm.   Neurological:      Mental Status: He is alert and oriented to person, place, and time.   Psychiatric:         Behavior: Behavior normal.         Procedures           ED Course                                 Lab Results (last 24 hours)       Procedure Component Value Units Date/Time    CBC & Differential [[id number]]  (Abnormal) Collected: 12/31/23 1100    Specimen: Blood Updated: 12/31/23 1109    Narrative:      The following orders were created for panel order CBC & Differential.  Procedure                               Abnormality         Status                     ---------                               -----------         ------                     CBC Auto Differential[[id number]]        Abnormal            Final result                 Please view results for these tests on the individual orders.    Comprehensive Metabolic Panel [[id number]]  (Abnormal) Collected: 12/31/23 1100    Specimen: Blood Updated: 12/31/23 1126     Glucose 122 mg/dL      BUN 8 mg/dL      Creatinine 0.75 mg/dL      Sodium 137 mmol/L      Potassium 3.7 mmol/L      Chloride 100 mmol/L      CO2 27.0 mmol/L      Calcium 9.1 mg/dL      Total Protein 8.4 g/dL      Albumin 4.2 g/dL      ALT (SGPT) 21 U/L      AST (SGOT) 17 U/L      Alkaline Phosphatase 78 U/L      Total Bilirubin 0.4 mg/dL      Globulin 4.2 gm/dL      A/G Ratio 1.0 g/dL      BUN/Creatinine Ratio 10.7     Anion Gap 10.0 mmol/L      eGFR 123.7 mL/min/1.73     Narrative:      GFR Normal  >60  Chronic Kidney Disease <60  Kidney Failure <15      Lipase [[id number]]  (Normal) Collected: 12/31/23 1100    Specimen: Blood Updated: 12/31/23 1122     Lipase 25 U/L     CBC Auto Differential [[id number]]  (Abnormal) Collected: 12/31/23 1100    Specimen: Blood Updated: 12/31/23 1109     WBC 10.72 10*3/mm3      RBC 5.26 10*6/mm3      Hemoglobin 13.8 g/dL      Hematocrit 42.4 %      MCV 80.6 fL      MCH 26.2 pg      MCHC 32.5 g/dL      RDW 13.7 %      RDW-SD 39.9 fl      MPV 8.5 fL      Platelets 367 10*3/mm3      Neutrophil % 70.6 %      Lymphocyte % 21.5 %      Monocyte % 4.7 %      Eosinophil % 2.2 %      Basophil % 0.6 %      Immature Grans % 0.4 %      Neutrophils, Absolute 7.58 10*3/mm3      Lymphocytes, Absolute 2.30 10*3/mm3      Monocytes, Absolute 0.50 10*3/mm3      Eosinophils, Absolute 0.24 10*3/mm3      Basophils, Absolute 0.06 10*3/mm3      Immature Grans, Absolute 0.04 10*3/mm3      nRBC 0.0 /100 WBC     Urinalysis With Culture If Indicated - Urine, Clean Catch [[id number]]  (Abnormal) Collected: 12/31/23 1157    Specimen: Urine, Clean Catch Updated: 12/31/23 1230     Color, UA Yellow     Appearance, UA Clear     pH, UA 5.5     Specific Gravity, UA 1.024     Glucose, UA Negative     Ketones, UA Trace     Bilirubin, UA Negative     Blood, UA Large (3+)     Protein, UA Trace     Leuk Esterase, UA Small (1+)     Nitrite, UA Negative     Urobilinogen, UA 1.0 E.U./dL    Narrative:      In absence of clinical symptoms, the presence of pyuria, bacteria, and/or nitrites on the urinalysis result does not correlate with infection.    Urinalysis, Microscopic Only - Urine, Clean Catch [[id number]]  (Abnormal) Collected: 12/31/23 1157    Specimen: Urine, Clean Catch Updated: 12/31/23 1230     RBC, UA 11-20 /HPF      WBC, UA 3-5 /HPF      Comment: Urine culture not indicated.        Bacteria, UA None Seen /HPF      Squamous Epithelial Cells, UA 3-6 /HPF      Hyaline Casts, UA 0-2 /LPF      Methodology Manual Light  Microscopy           CT Abdomen Pelvis Stone Protocol   Final Result       2 mm calculus at the right UVJ with minimal upstream hydronephrosis.       Stable borderline splenomegaly.              This report was signed and finalized on 12/31/2023 11:24 AM by Jeffrey Burciaga.               CRISTINA reviewed by Graciela King MD       Medical Decision Making  Patient is a 33-year-old male with no significant past medical history who presents to the ER with abdominal pain.  Patient states he has had sharp right lower quadrant abdominal pain since this morning.  Patient states the pain was very sudden in onset.  He has had associated nausea and vomiting.  He has never had pain like this previously.  Nothing really makes his symptoms better or worse.  He denies any fever, chest pain, shortness of air, diarrhea, urinary changes, neurologic changes.    Differential diagnosis: Kidney stone, UTI, appendicitis    Patient was given IV fluids, Dilaudid, Toradol and Zofran.  Pain improved with treatment.  Labs were unremarkable.  Urinalysis showed hematuria but no evidence of infection.  CT scan of the abdomen pelvis showed a 2 mm calculus at the right UVJ with minimal upstream hydronephrosis.  Patient also has stable borderline splenomegaly.  Patient's pain was well-controlled as well as his nausea and vomiting.  He is appropriate for outpatient treatment.  He will be discharged home with a short course of Percocet for pain control as well as Flomax and Zofran to follow-up with his PCP and urology.  He is to return for any worse or new pain, fever, vomiting or other concerns.  Patient and wife agreeable to plan.    Problems Addressed:  Kidney stone: complicated acute illness or injury  Splenomegaly: complicated acute illness or injury    Amount and/or Complexity of Data Reviewed  Labs: ordered. Decision-making details documented in ED Course.  Radiology: ordered. Decision-making details documented in ED  Course.    Risk  Prescription drug management.        Final diagnoses:   Kidney stone   Splenomegaly       ED Disposition  ED Disposition       ED Disposition   Discharge    Condition   Good    Comment   --               Lisha Palumbo, APRN  [address] [id number] [phone]    Schedule an appointment as soon as possible for a visit       Rupert Encarnacion MD  [address]  [address]  [facility] [address]  [phone]    Schedule an appointment as soon as possible for a visit            Medication List        New Prescriptions      ondansetron ODT 4 MG disintegrating tablet  Commonly known as: ZOFRAN-ODT  Place 1 tablet on the tongue Every 8 (Eight) Hours As Needed for Nausea or Vomiting.     oxyCODONE-acetaminophen 5-325 MG per tablet  Commonly known as: PERCOCET  Take 1 tablet by mouth Every 8 (Eight) Hours As Needed for Moderate Pain.     tamsulosin 0.4 MG capsule 24 hr capsule  Commonly known as: FLOMAX  Take 1 capsule by mouth Every Night.               Where to Get Your Medications        These medications were sent to [facility]/pharmacy #[id number] - [place], [place] - [address] AT ACROSS FROM [address] - [phone] [facility] [phone]   [address] [facility] [address]      Phone: [phone]   ondansetron ODT 4 MG disintegrating tablet  oxyCODONE-acetaminophen 5-325 MG per tablet  tamsulosin 0.4 MG capsule 24 hr capsule            Graciela King MD  12/31/23 0561

## 2024-04-24 ENCOUNTER — HOSPITAL ENCOUNTER (EMERGENCY)
Facility: HOSPITAL | Age: 34
Discharge: HOME | End: 2024-04-24
Attending: FAMILY MEDICINE | Admitting: FAMILY MEDICINE
Payer: COMMERCIAL

## 2024-04-24 ENCOUNTER — APPOINTMENT (OUTPATIENT)
Dept: CT IMAGING | Facility: HOSPITAL | Age: 34
End: 2024-04-24
Payer: COMMERCIAL

## 2024-04-24 VITALS
OXYGEN SATURATION: 98 % | HEART RATE: 97 BPM | BODY MASS INDEX: 46.65 KG/M2 | WEIGHT: 315 LBS | HEIGHT: 69 IN | DIASTOLIC BLOOD PRESSURE: 85 MMHG | RESPIRATION RATE: 20 BRPM | SYSTOLIC BLOOD PRESSURE: 155 MMHG | TEMPERATURE: 97.9 F

## 2024-04-24 DIAGNOSIS — M62.830 LUMBAR PARASPINAL MUSCLE SPASM: ICD-10-CM

## 2024-04-24 DIAGNOSIS — M51.36 DEGENERATIVE DISC DISEASE, LUMBAR: Primary | ICD-10-CM

## 2024-04-24 DIAGNOSIS — E66.01 MORBID OBESITY: ICD-10-CM

## 2024-04-24 PROCEDURE — 72131 CT LUMBAR SPINE W/O DYE: CPT

## 2024-04-24 PROCEDURE — 25010000002 ORPHENADRINE CITRATE PER 60 MG: Performed by: FAMILY MEDICINE

## 2024-04-24 PROCEDURE — 25010000002 KETOROLAC TROMETHAMINE PER 15 MG: Performed by: FAMILY MEDICINE

## 2024-04-24 PROCEDURE — 99284 EMERGENCY DEPT VISIT MOD MDM: CPT

## 2024-04-24 PROCEDURE — 96372 THER/PROPH/DIAG INJ SC/IM: CPT

## 2024-04-24 RX ORDER — ORPHENADRINE CITRATE 30 MG/ML
60 INJECTION INTRAMUSCULAR; INTRAVENOUS ONCE
Status: COMPLETED | OUTPATIENT
Start: 2024-04-24 | End: 2024-04-24

## 2024-04-24 RX ORDER — DICLOFENAC SODIUM 75 MG/1
75 TABLET, DELAYED RELEASE ORAL 2 TIMES DAILY PRN
Qty: 60 TABLET | Refills: 0 | Status: SHIPPED | OUTPATIENT
Start: 2024-04-24 | End: 2024-05-24

## 2024-04-24 RX ORDER — TIZANIDINE HYDROCHLORIDE 4 MG/1
4 CAPSULE, GELATIN COATED ORAL 3 TIMES DAILY
Qty: 21 CAPSULE | Refills: 0 | Status: SHIPPED | OUTPATIENT
Start: 2024-04-24 | End: 2024-05-01

## 2024-04-24 RX ORDER — KETOROLAC TROMETHAMINE 30 MG/ML
60 INJECTION, SOLUTION INTRAMUSCULAR; INTRAVENOUS ONCE
Status: COMPLETED | OUTPATIENT
Start: 2024-04-24 | End: 2024-04-24

## 2024-04-24 RX ADMIN — KETOROLAC TROMETHAMINE 60 MG: 30 INJECTION, SOLUTION INTRAMUSCULAR; INTRAVENOUS at 16:51

## 2024-04-24 RX ADMIN — ORPHENADRINE CITRATE 60 MG: 60 INJECTION INTRAMUSCULAR; INTRAVENOUS at 16:51

## 2024-04-24 NOTE — ED PROVIDER NOTES
HPI:    Patient is a 34-year-old morbidly obese white male who presents to the emergency room with a complaint of having low back pain nontraumatic.  Patient states started 10:00 today while at work just sitting down.  He rates his pain 8 out of 10.  There is no loss of bowel or bladder control.  No fever chills night sweats no nausea vomiting diarrhea.  Patient states that this has not happened in the past.    REVIEW OF SYSTEMS  CONSTITUTIONAL:  No complaints of fever, chills,or weakness  EYES:  No complaints of discharge   ENT: No complaints of sore throat or ear pain  CARDIOVASCULAR:  No complaints of chest pain, palpitations, or swelling  RESPIRATORY:  No complaints of cough or shortness of breath  GI:  No complaints of abdominal pain, nausea, vomiting, or diarrhea  MUSCULOSKELETAL: Positive for 8 out of 10 low back pain nontraumatic   SKIN:  No complaints of rash  NEUROLOGIC:  No complaints of headache, focal weakness, or sensory changes  ENDOCRINE:  No complaints of polyuria or polydipsia  LYMPHATIC:  No complaints of swollen glands  GENITOURINARY: No complaints of urinary frequency or hematuria        PAST MEDICAL HISTORY  Past Medical History:   Diagnosis Date    Psoriasis        FAMILY HISTORY  History reviewed. No pertinent family history.    SOCIAL HISTORY  Social History     Socioeconomic History    Marital status:    Tobacco Use    Smoking status: Former    Smokeless tobacco: Never   Vaping Use    Vaping status: Never Used   Substance and Sexual Activity    Alcohol use: No    Drug use: Not Currently     Types: Methamphetamines     Comment: IN DRUG COURT  past    Sexual activity: Defer       IMMUNIZATION HISTORY  Deferred to primary care physician.    SURGICAL HISTORY  Past Surgical History:   Procedure Laterality Date    HERNIA REPAIR      TYMPANOSTOMY TUBE PLACEMENT         CURRENT MEDICATIONS  No current facility-administered medications for this encounter.    Current Outpatient Medications:     " albuterol sulfate  (90 Base) MCG/ACT inhaler, Inhale 2 puffs Every 6 (Six) Hours As Needed for Wheezing or Shortness of Air. (Patient not taking: Reported on 5/9/2023), Disp: 6.7 g, Rfl: 0    ondansetron ODT (ZOFRAN-ODT) 4 MG disintegrating tablet, Place 1 tablet on the tongue Every 8 (Eight) Hours As Needed for Nausea or Vomiting., Disp: 12 tablet, Rfl: 0    oxyCODONE-acetaminophen (PERCOCET) 5-325 MG per tablet, Take 1 tablet by mouth Every 8 (Eight) Hours As Needed for Moderate Pain., Disp: 12 tablet, Rfl: 0    tamsulosin (FLOMAX) 0.4 MG capsule 24 hr capsule, Take 1 capsule by mouth Every Night., Disp: 14 capsule, Rfl: 0    ALLERGIES  No Known Allergies    Musculoskeletal exam    VITAL SIGNS:   /85   Pulse 97   Temp 97.9 °F (36.6 °C)   Resp 20   Ht 175.3 cm (69\")   Wt (!) 170 kg (375 lb)   SpO2 98%   BMI 55.38 kg/m²     Constitutional: Patient is alert and in no distress.  Patient with moderate to severe low back discomfort.    Cardiovascular: S1-S2 regular rate and rhythm. No murmurs rubs or gallops are noted.    Respiratory: Patient is clear to auscultation bilaterally with no wheezing or rhonchi.  Chest wall is nontender.  There are no external lesions on the chest.  There is no crepitance    Abdomen: Soft, nontender. Bowel sounds are normal in all 4 quadrants. There is no rebound or guarding noted.  There is no abdominal distention or hepatosplenomegaly.      Back: Positive tenderness palpation of the lower lumbar spine and paralumbar musculature.  There is no step-off noted.  Straight leg raise causes pain but no radicular symptoms.    Musculoskeletal: No tenderness to palpation of the 4 extremities or the hips.    Integumentary: No acute changes noted    Genitourinary: Patient is voiding appropriately.    Psychiatric: Normal affect and mood      RADIOLOGY/PROCEDURES        CT Lumbar Spine Without Contrast   Final Result   1. Degenerative disc, endplate, and facet changes are greatest " at L5-S1.   2. Overall very similar appearance as compared with 5 years ago.       This report was signed and finalized on 4/24/2024 5:45 PM by Dr. Moris Schwarz MD.                 FUTURE APPOINTMENTS     No future appointments.         COURSE & MEDICAL DECISION MAKING    Patient's partial differential diagnosis can include:    Obesity causing degenerative disc, degenerative disc disease, lumbar strain, lumbar disc herniation, lumbar spondylolisthesis, lumbar spondylosis, lumbar spinal stenosis, lumbar strain, lumbar spasm, lumbar compression fracture, and others           Pain mildly improved at IM Toradol and Norflex.  Patient has no acute findings.  Lumbar skin essentially same as it was 5 years ago.      Patient's level of risk: Low      CRITICAL CARE    CRITICAL CARE: No    CRITICAL CARE TIME: None      No results found for this or any previous visit (from the past 24 hour(s)).         Also  Old charts were reviewed per [facility] EMR.  Pertinent details are summarized above.  All laboratory, radiologic, and EKG studies that were performed in the Emergency Department were a necessary part of the evaluation needed to exclude unstable or  emergent medical conditions.     Patient was hemodynamically and neurologically stable in the ED.   Pertinent studies were reviewed as above.     The patient received:  Medications   ketorolac (TORADOL) injection 60 mg (60 mg Intramuscular Given 4/24/24 1651)   orphenadrine (NORFLEX) injection 60 mg (60 mg Intramuscular Given 4/24/24 1651)            ED Disposition       ED Disposition   Discharge    Condition   Stable    Comment   --                 Dragon disclaimer:  Part of this note may be an electronic transcription/translation of spoken language to printed text using the Dragon Dictation System.     I have reviewed the patient’s prescription history via a prescription monitoring program.  This information is consistent with my knowledge of the patient’s controlled substance  use history.    Patient evaluated during Coronavirus Pandemic. Isolation practices followed according to [facility] policy.    FINAL IMPRESSION   Diagnosis Plan   1. Degenerative disc disease, lumbar        2. Morbid obesity        3. Lumbar paraspinal muscle spasm              MD Shemar Lott Jr, Thomas Mark Jr., MD  04/24/24 2832

## 2024-05-15 ENCOUNTER — OFFICE VISIT (OUTPATIENT)
Dept: NEUROLOGY | Age: 34
End: 2024-05-15
Payer: COMMERCIAL

## 2024-05-15 VITALS
SYSTOLIC BLOOD PRESSURE: 136 MMHG | DIASTOLIC BLOOD PRESSURE: 74 MMHG | HEART RATE: 79 BPM | HEIGHT: 69 IN | WEIGHT: 315 LBS | OXYGEN SATURATION: 98 % | BODY MASS INDEX: 46.65 KG/M2

## 2024-05-15 DIAGNOSIS — Z99.89 CPAP (CONTINUOUS POSITIVE AIRWAY PRESSURE) DEPENDENCE: ICD-10-CM

## 2024-05-15 DIAGNOSIS — Z76.89 ESTABLISHING CARE WITH NEW DOCTOR, ENCOUNTER FOR: ICD-10-CM

## 2024-05-15 DIAGNOSIS — G47.33 OBSTRUCTIVE SLEEP APNEA: Primary | ICD-10-CM

## 2024-05-15 PROCEDURE — 99213 OFFICE O/P EST LOW 20 MIN: CPT | Performed by: PHYSICIAN ASSISTANT

## 2024-05-15 RX ORDER — DICLOFENAC SODIUM 75 MG/1
75 TABLET, DELAYED RELEASE ORAL 2 TIMES DAILY PRN
COMMUNITY
Start: 2024-04-24 | End: 2024-05-25

## 2024-05-15 RX ORDER — TIZANIDINE HYDROCHLORIDE 4 MG/1
CAPSULE, GELATIN COATED ORAL
COMMUNITY
Start: 2024-04-24

## 2024-05-15 RX ORDER — ALBUTEROL SULFATE 90 UG/1
2 AEROSOL, METERED RESPIRATORY (INHALATION) EVERY 6 HOURS PRN
COMMUNITY
Start: 2023-02-28

## 2024-05-15 NOTE — PROGRESS NOTES
REVIEW OF SYSTEMS    Constitutional: []Fever []Sweats []Chills [] Recent Injury [x] Denies all unless marked  HEENT:[]Headache  [] Head Injury [] Hearing Loss  [] Sore Throat  [] Ear Ache [x] Denies all unless marked  Spine:  [] Neck pain  [] Back pain  [] Sciaticia  [x] Denies all unless marked  Cardiovascular:[]Heart Disease []Palpitations [] Chest Pain   [x] Denies all unless marked  Pulmonary: []Shortness of Breath []Cough   [x] Denies all unless marked  Psychiatric/Behavioral:[] Depression [] Anxiety [x] Denies all unless marked  Gastrointestinal: []Nausea  []Vomiting  []Abdominal Pain  []Constipation  []Diarrhea  [x] Denies all unless marked  Genitourinary:   [] Frequency  [] Urgency  [] Dysuria [] Incontinence  [x] Denies all unless marked  Extremities: []Pain  []Swelling  [x] Denies all unless marked  Musculoskeletal: [] Myalgias  [] Joint Pain  [] Arthritis [] Muscle Cramps [] Muscle Twitches  [x] Denies all unless marked  Sleep: []Insomnia[]Snoring []Restless Legs  [x]Sleep Apnea  []Daytime Sleepiness  [x] Denies all unless marked  Skin:[] Rash [] Color Change [x] Denies all unless marked   Neurological:[]Visual Disturbance [] Memory Loss []Loss of Balance []Slurred Speech []Weakness []Seizures  [] Dizziness [x] Denies all unless marked      
degenerative disc disease     Sleep apnea        Surgical History:  Past Surgical History:   Procedure Laterality Date    HERNIA REPAIR      TYMPANOSTOMY TUBE PLACEMENT         Current Medications:  Current Outpatient Medications   Medication Sig Dispense Refill    albuterol sulfate HFA (PROVENTIL;VENTOLIN;PROAIR) 108 (90 Base) MCG/ACT inhaler Inhale 2 puffs into the lungs every 6 hours as needed      diclofenac (VOLTAREN) 75 MG EC tablet Take 1 tablet by mouth 2 times daily as needed      tiZANidine (ZANAFLEX) 4 MG capsule TAKE 1 CAPSULE BY MOUTH 3 TIMES A DAY FOR 7 DAYS.       No current facility-administered medications for this visit.       Allergies:  Patient has no known allergies.    SOCIAL HISTORY:   Social History     Substance and Sexual Activity   Alcohol Use Not Currently     Social History     Substance and Sexual Activity   Drug Use Yes    Types: Marijuana (Weed), Methamphetamines (Crystal Meth), Cocaine     Social History     Tobacco Use   Smoking Status Former    Current packs/day: 0.25    Types: Cigarettes   Smokeless Tobacco Former    Types: Chew    Quit date: 2015       FAMILY HISTORY:   Family History   Problem Relation Age of Onset    Asthma Mother     Brain Cancer Father             REVIEW OF SYSTEMS     Constitutional: []Fever []Sweats []Chills [] Recent Injury [x] Denies all unless marked  HEENT:[]Headache  [] Head Injury [] Hearing Loss  [] Sore Throat  [] Ear Ache [x] Denies all unless marked  Spine:  [] Neck pain  [] Back pain  [] Sciaticia  [x] Denies all unless marked  Cardiovascular:[]Heart Disease []Palpitations [] Chest Pain   [x] Denies all unless marked  Pulmonary: []Shortness of Breath []Cough   [x] Denies all unless marked  Psychiatric/Behavioral:[] Depression [] Anxiety [x] Denies all unless marked  Gastrointestinal: []Nausea  []Vomiting  []Abdominal Pain  []Constipation  []Diarrhea  [x] Denies all unless marked  Genitourinary:   [] Frequency  [] Urgency  [] Dysuria []

## 2024-05-15 NOTE — PATIENT INSTRUCTIONS
between if new issues arise. The purpose of long-term follow-up is to assess usage and monitor for recurrent NASRIN, new side effects, air leakage, and fluctuations in body weight.    WHERE TO GET MORE INFORMATION -- Your healthcare provider is the best source of information for questions and concerns related to your medical problem.    Organizations  American Sleep Apnea Association  Provides information about sleep apnea to the public, publishes a newsletter, and serves as an advocate for people with the disorder.  [address] [place] [address]  [address]   [place] [address]   nia@[email or web address]   http://www.sleepapnea.org   Tel: [phone]   Fax: [phone]   National [employer] Foundation  National nonprofit organization that works to improve public health and safety by promoting public understanding of sleep and sleep disorders. Supports sleep-related education, research, and advocacy; produces and distributes educational materials to the public and healthcare professionals; and offers postdoctoral fellowships and grants for sleep researchers.  [address] [place] [address] [address]   [place] [address]   [email or web address]   [email or web address]   Tel: [phone]   Fax: [phone]    Important information:  Medicare/private insurance CPAP/BiPAP/APAP requirements:  Medicare/private insurance has specific requirements for PAP compliance that must be met during the first 90 days of use to continue coverage for CPAP/BiPAP/APAP  from day 91 and beyond. The policy requires that patients use a PAP device 4 hours per 24 hour period, at least 70% of the time over a 30 day period. This data must be downloaded as a report direct from the PAP devices. This is called a compliance download.  Your PAP supplier will assist you in this matter.     Note:  Where applicable, we will utilize PAP device efficiency reports, additional testing, and face-to-face  clinical evaluation subsequent to any treatment, changes in

## 2024-05-30 ENCOUNTER — HOSPITAL ENCOUNTER (EMERGENCY)
Facility: HOSPITAL | Age: 34
Discharge: HOME | End: 2024-05-30
Payer: COMMERCIAL

## 2024-05-30 ENCOUNTER — APPOINTMENT (OUTPATIENT)
Dept: GENERAL RADIOLOGY | Facility: HOSPITAL | Age: 34
End: 2024-05-30
Payer: COMMERCIAL

## 2024-05-30 VITALS
SYSTOLIC BLOOD PRESSURE: 123 MMHG | WEIGHT: 315 LBS | BODY MASS INDEX: 46.65 KG/M2 | HEIGHT: 69 IN | RESPIRATION RATE: 20 BRPM | TEMPERATURE: 98.8 F | OXYGEN SATURATION: 98 % | DIASTOLIC BLOOD PRESSURE: 76 MMHG | HEART RATE: 78 BPM

## 2024-05-30 DIAGNOSIS — R06.2 WHEEZING: ICD-10-CM

## 2024-05-30 DIAGNOSIS — R05.2 SUBACUTE COUGH: Primary | ICD-10-CM

## 2024-05-30 LAB
ALBUMIN SERPL-MCNC: 3.9 G/DL (ref 3.5–5.2)
ALBUMIN/GLOB SERPL: 1.1 G/DL
ALP SERPL-CCNC: 71 U/L (ref 39–117)
ALT SERPL W P-5'-P-CCNC: 19 U/L (ref 1–41)
ANION GAP SERPL CALCULATED.3IONS-SCNC: 9 MMOL/L (ref 5–15)
AST SERPL-CCNC: 15 U/L (ref 1–40)
BASOPHILS # BLD AUTO: 0.06 10*3/MM3 (ref 0–0.2)
BASOPHILS NFR BLD AUTO: 0.7 % (ref 0–1.5)
BILIRUB SERPL-MCNC: 0.3 MG/DL (ref 0–1.2)
BUN SERPL-MCNC: 8 MG/DL (ref 6–20)
BUN/CREAT SERPL: 12.3 (ref 7–25)
CALCIUM SPEC-SCNC: 8.3 MG/DL (ref 8.6–10.5)
CHLORIDE SERPL-SCNC: 102 MMOL/L (ref 98–107)
CO2 SERPL-SCNC: 28 MMOL/L (ref 22–29)
CREAT SERPL-MCNC: 0.65 MG/DL (ref 0.76–1.27)
D DIMER PPP FEU-MCNC: <0.27 MCGFEU/ML (ref 0–0.5)
DEPRECATED RDW RBC AUTO: 42.3 FL (ref 37–54)
EGFRCR SERPLBLD CKD-EPI 2021: 128.4 ML/MIN/1.73
EOSINOPHIL # BLD AUTO: 0.53 10*3/MM3 (ref 0–0.4)
EOSINOPHIL NFR BLD AUTO: 5.8 % (ref 0.3–6.2)
ERYTHROCYTE [DISTWIDTH] IN BLOOD BY AUTOMATED COUNT: 13.8 % (ref 12.3–15.4)
FLUAV RNA RESP QL NAA+PROBE: NOT DETECTED
FLUBV RNA RESP QL NAA+PROBE: NOT DETECTED
GLOBULIN UR ELPH-MCNC: 3.5 GM/DL
GLUCOSE SERPL-MCNC: 168 MG/DL (ref 65–99)
HCT VFR BLD AUTO: 39.9 % (ref 37.5–51)
HGB BLD-MCNC: 12.6 G/DL (ref 13–17.7)
IMM GRANULOCYTES # BLD AUTO: 0.05 10*3/MM3 (ref 0–0.05)
IMM GRANULOCYTES NFR BLD AUTO: 0.5 % (ref 0–0.5)
LYMPHOCYTES # BLD AUTO: 1.76 10*3/MM3 (ref 0.7–3.1)
LYMPHOCYTES NFR BLD AUTO: 19.3 % (ref 19.6–45.3)
MCH RBC QN AUTO: 26.4 PG (ref 26.6–33)
MCHC RBC AUTO-ENTMCNC: 31.6 G/DL (ref 31.5–35.7)
MCV RBC AUTO: 83.5 FL (ref 79–97)
MONOCYTES # BLD AUTO: 0.3 10*3/MM3 (ref 0.1–0.9)
MONOCYTES NFR BLD AUTO: 3.3 % (ref 5–12)
NEUTROPHILS NFR BLD AUTO: 6.4 10*3/MM3 (ref 1.7–7)
NEUTROPHILS NFR BLD AUTO: 70.4 % (ref 42.7–76)
NRBC BLD AUTO-RTO: 0 /100 WBC (ref 0–0.2)
NT-PROBNP SERPL-MCNC: 49.6 PG/ML (ref 0–450)
PLATELET # BLD AUTO: 305 10*3/MM3 (ref 140–450)
PMV BLD AUTO: 8.7 FL (ref 6–12)
POTASSIUM SERPL-SCNC: 4.1 MMOL/L (ref 3.5–5.2)
PROT SERPL-MCNC: 7.4 G/DL (ref 6–8.5)
RBC # BLD AUTO: 4.78 10*6/MM3 (ref 4.14–5.8)
RSV RNA RESP QL NAA+PROBE: NOT DETECTED
SARS-COV-2 RNA RESP QL NAA+PROBE: NOT DETECTED
SODIUM SERPL-SCNC: 139 MMOL/L (ref 136–145)
TROPONIN T SERPL HS-MCNC: <6 NG/L
WBC NRBC COR # BLD AUTO: 9.1 10*3/MM3 (ref 3.4–10.8)

## 2024-05-30 PROCEDURE — 96374 THER/PROPH/DIAG INJ IV PUSH: CPT

## 2024-05-30 PROCEDURE — 80053 COMPREHEN METABOLIC PANEL: CPT

## 2024-05-30 PROCEDURE — 83880 ASSAY OF NATRIURETIC PEPTIDE: CPT

## 2024-05-30 PROCEDURE — 71046 X-RAY EXAM CHEST 2 VIEWS: CPT

## 2024-05-30 PROCEDURE — 94640 AIRWAY INHALATION TREATMENT: CPT

## 2024-05-30 PROCEDURE — 87637 SARSCOV2&INF A&B&RSV AMP PRB: CPT

## 2024-05-30 PROCEDURE — 25010000002 METHYLPREDNISOLONE PER 125 MG

## 2024-05-30 PROCEDURE — 99284 EMERGENCY DEPT VISIT MOD MDM: CPT

## 2024-05-30 PROCEDURE — 94799 UNLISTED PULMONARY SVC/PX: CPT

## 2024-05-30 PROCEDURE — 85025 COMPLETE CBC W/AUTO DIFF WBC: CPT

## 2024-05-30 PROCEDURE — 84484 ASSAY OF TROPONIN QUANT: CPT

## 2024-05-30 PROCEDURE — 85379 FIBRIN DEGRADATION QUANT: CPT

## 2024-05-30 RX ORDER — METHYLPREDNISOLONE 4 MG/1
TABLET ORAL
Qty: 21 TABLET | Refills: 0 | Status: SHIPPED | OUTPATIENT
Start: 2024-05-30

## 2024-05-30 RX ORDER — IPRATROPIUM BROMIDE AND ALBUTEROL SULFATE 2.5; .5 MG/3ML; MG/3ML
3 SOLUTION RESPIRATORY (INHALATION) ONCE
Status: COMPLETED | OUTPATIENT
Start: 2024-05-30 | End: 2024-05-30

## 2024-05-30 RX ORDER — METHYLPREDNISOLONE SODIUM SUCCINATE 125 MG/2ML
125 INJECTION, POWDER, LYOPHILIZED, FOR SOLUTION INTRAMUSCULAR; INTRAVENOUS ONCE
Status: COMPLETED | OUTPATIENT
Start: 2024-05-30 | End: 2024-05-30

## 2024-05-30 RX ORDER — ALBUTEROL SULFATE 1.25 MG/3ML
1 SOLUTION RESPIRATORY (INHALATION) EVERY 6 HOURS PRN
Qty: 30 ML | Refills: 0 | Status: SHIPPED | OUTPATIENT
Start: 2024-05-30

## 2024-05-30 RX ORDER — SODIUM CHLORIDE 0.9 % (FLUSH) 0.9 %
10 SYRINGE (ML) INJECTION AS NEEDED
Status: DISCONTINUED | OUTPATIENT
Start: 2024-05-30 | End: 2024-05-30 | Stop reason: HOSPADM

## 2024-05-30 RX ADMIN — IPRATROPIUM BROMIDE AND ALBUTEROL SULFATE 3 ML: .5; 3 SOLUTION RESPIRATORY (INHALATION) at 08:38

## 2024-05-30 RX ADMIN — METHYLPREDNISOLONE SODIUM SUCCINATE 125 MG: 125 INJECTION, POWDER, FOR SOLUTION INTRAMUSCULAR; INTRAVENOUS at 08:24

## 2024-05-30 NOTE — ED PROVIDER NOTES
Subjective   History of Present Illness  Patient is a 34-year-old male who presents emergency department with a cough, wheezing, shortness of breath.  Patient reports that he has had a cough for approximately 3 months.  He states that he has been on 2 rounds of prednisone, but the cough always returns.  Patient states that he is wheezing and whenever he coughs, he coughs up a lot of mucus.  Patient reports that he has had some blood in his sputum at times when it first started, but has not in a while.  Patient reports that he feels like his throat was just irritated and that is why there was some blood in his sputum. Patient denies any fevers.  He denies chest pain.  Patient does have wheezing on exam.          Review of Systems   Respiratory:  Positive for cough, shortness of breath and wheezing.    All other systems reviewed and are negative.      Past Medical History:   Diagnosis Date    Psoriasis        No Known Allergies    Past Surgical History:   Procedure Laterality Date    HERNIA REPAIR      TYMPANOSTOMY TUBE PLACEMENT         History reviewed. No pertinent family history.    Social History     Socioeconomic History    Marital status:    Tobacco Use    Smoking status: Former    Smokeless tobacco: Never   Vaping Use    Vaping status: Never Used   Substance and Sexual Activity    Alcohol use: No    Drug use: Not Currently     Types: Methamphetamines     Comment: IN DRUG COURT  past    Sexual activity: Defer           Objective   Physical Exam  Vitals and nursing note reviewed.   Constitutional:       General: He is not in acute distress.     Appearance: He is normal weight. He is obese. He is not ill-appearing or toxic-appearing.   HENT:      Head: Normocephalic.   Cardiovascular:      Rate and Rhythm: Normal rate and regular rhythm.      Pulses: Normal pulses.      Heart sounds: Normal heart sounds.   Pulmonary:      Effort: Pulmonary effort is normal.      Breath sounds: Wheezing present.    Abdominal:      General: Abdomen is flat. Bowel sounds are normal. There is no distension.      Palpations: Abdomen is soft.      Tenderness: There is no abdominal tenderness.   Musculoskeletal:         General: Normal range of motion.      Cervical back: Normal range of motion and neck supple.   Skin:     General: Skin is warm and dry.   Neurological:      General: No focal deficit present.      Mental Status: He is alert and oriented to person, place, and time. Mental status is at baseline.   Psychiatric:         Mood and Affect: Mood normal.         Behavior: Behavior normal.         Thought Content: Thought content normal.         Judgment: Judgment normal.         Procedures           ED Course                                             Medical Decision Making  Jose Alejandro Edwards is a very pleasant 34 y.o. male who presents to the emergency department for cough, wheezing, shortness of breath.     Patient was non-toxic and not-ill appearing on arrival. Vital signs stable.     Patient's presentation raises suspicion for differentials including, but not limited to, viral illness, pneumonia, asthma.     External (non-ED) record review: None    Given this, Jose Alejandro was placed on the monitor. Laboratory studies and imaging studies were ordered including CBC, CMP, D-dimer, troponin, BNP, COVID/flu swab, EKG, chest x-ray.     Jose Alejandro was given DuoNeb and IV Solu-Medrol for symptomatic relief.    Imaging was reviewed by radiologist.  Chest x-ray negative for acute findings.    Decision rules/scores evaluated: YEARS Algorithm for Pulmonary Embolism (PE) - MDCalc Calculated on May 30 2024 10:28 AM PE excluded -> YEARS algorithm rules out PE (0.43% with symptomatic VTE during 3-month follow-up). Wells score for PE 1 point, low risk.  Unremarkable D-dimer.  Patient with no unilateral leg swelling.  He did report mild hemoptysis whenever his cough initially started approximately 3 months ago.  He stated that  he has not had any hemoptysis recently.  Low suspicion for PE.    Labs were reviewed.  CBC with no leukocytosis, stable H&H.  CMP unremarkable.  Troponin, D-dimer, BMP unremarkable.  Negative for COVID, flu, RSV.  On re-evaluation, patient remained hemodynamically stable and appeared to be comfortable and in no acute distress.  Lung sounds had improved after DuoNeb and IV Solu-Medrol.    I discussed all of the lab and imaging results with the patient during this visit in the emergency department. I answered all the questions regarding the emergency department evaluation, diagnosis, and treatment plan. We talked about how crucial it is for the patient to follow up by calling their primary care provider as soon as possible to schedule an appointment for within the next few days or as soon as possible so that the symptoms can be reassessed to see if they have improved or to answer any additional questions. I also provided the patient with advice on returning safely and urged the patient to visit the emergency department right away if any worsening or new symptoms appeared. The patient verbalized understanding of the discharge instructions and agreed with them. Jose Alejandro was discharged in stable condition.    Signed by:   JUDE Villela 5/30/2024 09:25 CDT     Dragon disclaimer:  Part of this note may be an electronic transcription/translation of spoken language to printed text using the Dragon Dictation System.    Problems Addressed:  Subacute cough: acute illness or injury  Wheezing: acute illness or injury    Amount and/or Complexity of Data Reviewed  Labs: ordered.  Radiology: ordered.  ECG/medicine tests: ordered.    Risk  Prescription drug management.        Final diagnoses:   Subacute cough   Wheezing       ED Disposition  ED Disposition       ED Disposition   Discharge    Condition   Stable    Comment   --               Lisha Palumbo, APRN  [address] [facility] [address] [phone]    Schedule  an appointment as soon as possible for a visit in 1 day      [facility] EMERGENCY DEPARTMENT  [address] [place] [address] [place]  [facility] [address] [phone]  Go to   If symptoms worsen         Medication List        New Prescriptions      albuterol 1.25 MG/3ML nebulizer solution  Commonly known as: ACCUNEB  Take 3 mL by nebulization Every 6 (Six) Hours As Needed for Shortness of Air.  Replaces: albuterol sulfate  (90 Base) MCG/ACT inhaler     methylPREDNISolone 4 MG dose pack  Commonly known as: MEDROL  Take as directed on package instructions.            Stop      albuterol sulfate  (90 Base) MCG/ACT inhaler  Commonly known as: PROVENTIL HFA;VENTOLIN HFA;PROAIR HFA  Replaced by: albuterol 1.25 MG/3ML nebulizer solution               Where to Get Your Medications        These medications were sent to [facility]/pharmacy #[id number] - [place], [place] - [address] AT ACROSS FROM [address]A BLANCHARD  [phone] [facility] [phone]   [address] [facility] [address]      Phone: [phone]   albuterol 1.25 MG/3ML nebulizer solution  methylPREDNISolone 4 MG dose pack            Bindu Graves APRN  05/30/24 [id number]

## 2024-05-30 NOTE — Clinical Note
[facility] EMERGENCY DEPARTMENT  [address]  [facility] [address]  Phone: [phone]    Jose Alejandro Edwards was seen and treated in our emergency department on 5/30/2024.  He may return to work on 05/31/2024.         Thank you for choosing [facility].    Bindu Graves APRN

## 2024-05-30 NOTE — DISCHARGE INSTRUCTIONS
Today you are seen in the ER for your symptoms.  Your lab work was reassuring.  Your chest x-ray was negative for acute findings.  Steroids and nebulizer treatments have been prescribed at discharge.  You will need to follow-up with primary care provider soon as possible to reassess symptoms.  Please return to the ER for any new or worsening symptoms.

## 2024-07-31 LAB
QT INTERVAL: 378 MS
QTC INTERVAL: 439 MS

## 2024-08-12 ENCOUNTER — TELEPHONE (OUTPATIENT)
Dept: FAMILY MEDICINE CLINIC | Facility: CLINIC | Age: 34
End: 2024-08-12

## 2024-08-12 NOTE — TELEPHONE ENCOUNTER
Caller: Jose Alejandro Edwards    Relationship: Self    Best call back number: [phone]     What form or medical record are you requesting: NEW PATIENT PAPERWORK/ADHD PACKET    How would you like to receive the form or medical records (pick-up, mail, fax): [phone]    Timeframe paperwork needed: ASAP

## 2024-08-16 ENCOUNTER — OFFICE VISIT (OUTPATIENT)
Dept: FAMILY MEDICINE CLINIC | Facility: CLINIC | Age: 34
End: 2024-08-16
Payer: COMMERCIAL

## 2024-08-16 VITALS
HEIGHT: 69 IN | DIASTOLIC BLOOD PRESSURE: 88 MMHG | TEMPERATURE: 98.3 F | SYSTOLIC BLOOD PRESSURE: 135 MMHG | OXYGEN SATURATION: 98 % | WEIGHT: 315 LBS | RESPIRATION RATE: 19 BRPM | HEART RATE: 84 BPM | BODY MASS INDEX: 46.65 KG/M2

## 2024-08-16 DIAGNOSIS — Z13.39 ADHD (ATTENTION DEFICIT HYPERACTIVITY DISORDER) EVALUATION: ICD-10-CM

## 2024-08-16 DIAGNOSIS — R41.840 POOR CONCENTRATION: ICD-10-CM

## 2024-08-16 DIAGNOSIS — Z13.220 LIPID SCREENING: ICD-10-CM

## 2024-08-16 DIAGNOSIS — E66.01 CLASS 3 SEVERE OBESITY DUE TO EXCESS CALORIES WITH SERIOUS COMORBIDITY AND BODY MASS INDEX (BMI) OF 50.0 TO 59.9 IN ADULT: ICD-10-CM

## 2024-08-16 DIAGNOSIS — Z13.1 DIABETES MELLITUS SCREENING: ICD-10-CM

## 2024-08-16 DIAGNOSIS — Z13.0 SCREENING FOR DEFICIENCY ANEMIA: ICD-10-CM

## 2024-08-16 DIAGNOSIS — L40.9 PSORIASIS: ICD-10-CM

## 2024-08-16 DIAGNOSIS — G47.33 OSA ON CPAP: Primary | ICD-10-CM

## 2024-08-16 DIAGNOSIS — Z11.59 NEED FOR HEPATITIS C SCREENING TEST: ICD-10-CM

## 2024-08-16 PROBLEM — E66.813 CLASS 3 SEVERE OBESITY DUE TO EXCESS CALORIES WITH SERIOUS COMORBIDITY AND BODY MASS INDEX (BMI) OF 50.0 TO 59.9 IN ADULT: Status: ACTIVE | Noted: 2024-08-16

## 2024-08-16 LAB
EXPIRATION DATE: NORMAL
HBA1C MFR BLD: 5.5 % (ref 4.5–5.7)
Lab: NORMAL

## 2024-08-16 RX ORDER — ATOMOXETINE 25 MG/1
25 CAPSULE ORAL DAILY
Qty: 30 CAPSULE | Refills: 2 | Status: SHIPPED | OUTPATIENT
Start: 2024-08-16

## 2024-08-16 NOTE — PROGRESS NOTES
Subjective   Jose Alejandro Edwards is a 34 y.o. male.     History of Present Illness  The patient presents for evaluation of multiple medical concerns.    He was diagnosed with ADHD, ADD, and impulse control disorder during his senior year in high school. As he resumes school and takes on more responsibilities at work, he has noticed a resurgence of these symptoms. He struggles with sleep due to racing thoughts, even though he is not taking any medications like melatonin. His previous treatment with Vyvanse was effective, but he discontinued it due to scheduling conflicts with his doctor's appointments. He attempted to return to school but found himself overwhelmed and unable to focus, leading him to drop out. He is currently employed and teaches anger management classes, which have improved his own anger issues. However, he still experiences occasional flare-ups.    He has no other known health issues. He does not regularly exercise or follow a weight loss plan, although he has started lifting weights at the gym. He has a history of high triglycerides and believes his cholesterol may be elevated. He quit smoking cigarettes 7 to 8 years ago after a 4-year habit. He also has seasonal asthma, which requires an inhaler when he is ill or experiencing allergy symptoms.    He has severe psoriasis and has tried various creams without success. He has been dealing with a skin issue on his legs for several months.    He took Wellbutrin for 3 to 4 months in 2017 but stopped due to nightmares and lack of improvement in his concentration.    He has degenerative disc disease in his lower back. He has sleep apnea and uses a CPAP machine. He sees neurology once a year.    SOCIAL HISTORY  He does not smoke or drink alcohol. 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    FAMILY HISTORY  His sisters have depression and anxiety. His mother has depression. His father has glioblastoma. His granddaughter has diabetes and  "lupus.    Results  Laboratory Studies  Kidney function was normal. Liver numbers were normal. Electrolytes were pretty good. Sugar number was a little bit high. Mildly anemic.    The following portions of the patient's history were reviewed and updated as appropriate: allergies, current medications, past family history, past medical history, past social history, past surgical history, and problem list.        A review of systems was performed, and pertinent findings are noted in the HPI.    Objective   /88   Pulse 84   Temp 98.3 °F (36.8 °C) (Skin)   Resp 19   Ht 175.3 cm (69.02\")   Wt (!) 171 kg (376 lb 3.2 oz)   SpO2 98%   BMI 55.53 kg/m²    Class 3 Severe Obesity (BMI >=40). Obesity-related health conditions include the following: none. Obesity is newly identified. BMI is is above average; BMI management plan is completed. We discussed portion control and increasing exercise.     Physical Exam  Vitals and nursing note reviewed.   Constitutional:       General: He is not in acute distress.     Appearance: He is well-developed. He is obese. He is not diaphoretic.   HENT:      Head: Normocephalic and atraumatic.      Right Ear: External ear normal.      Left Ear: External ear normal.      Nose: Nose normal.   Eyes:      General:         Right eye: No discharge.         Left eye: No discharge.      Conjunctiva/sclera: Conjunctivae normal.   Neck:      Thyroid: No thyromegaly.      Trachea: No tracheal deviation.   Cardiovascular:      Rate and Rhythm: Normal rate and regular rhythm.      Heart sounds: Normal heart sounds.   Pulmonary:      Effort: Pulmonary effort is normal. No respiratory distress.      Breath sounds: Normal breath sounds. No stridor. No wheezing.   Chest:      Chest wall: No tenderness.   Abdominal:      General: There is no distension.      Palpations: Abdomen is soft.      Tenderness: There is no abdominal tenderness.   Musculoskeletal:      Cervical back: Normal range of motion. "   Lymphadenopathy:      Cervical: No cervical adenopathy.   Skin:     General: Skin is warm and dry.      Findings: Rash present.   Neurological:      Mental Status: He is alert and oriented to person, place, and time.      Motor: No abnormal muscle tone.      Coordination: Coordination normal.   Psychiatric:         Behavior: Behavior normal.         Thought Content: Thought content normal.         Judgment: Judgment normal.         ECG 12 Lead    Date/Time: 8/16/2024 4:17 PM  Performed by: Sharla Ramachandran MD    Authorized by: Sharla Ramachandran MD  Comparison: not compared with previous ECG   Previous ECG: no previous ECG available  Rhythm: sinus rhythm  Rate: normal  QRS axis: normal    Clinical impression: normal ECG          Assessment & Plan   Problems Addressed this Visit          Endocrine and Metabolic    Class 3 severe obesity due to excess calories with serious comorbidity and body mass index (BMI) of 50.0 to 59.9 in adult       Skin    Psoriasis    Relevant Orders    Ambulatory Referral to Dermatology       Sleep    HARSH on CPAP - Primary     Other Visit Diagnoses       Diabetes mellitus screening        Relevant Orders    Comprehensive metabolic panel    POC Glycosylated Hemoglobin (Hb A1C) (Completed)    Screening for deficiency anemia        Relevant Orders    CBC No Differential    Lipid screening        Relevant Orders    Lipid panel    Need for hepatitis C screening test        Relevant Orders    Hepatitis C antibody    ADHD (attention deficit hyperactivity disorder) evaluation        Relevant Orders    ToxASSURE Select 13 (MW) - Urine, Clean Catch    ECG 12 Lead    Poor concentration        Relevant Medications    atomoxetine (Strattera) 25 MG capsule          Diagnoses         Codes Comments    HARSH on CPAP    -  Primary ICD-10-CM: G47.33  ICD-9-CM: 327.23     Diabetes mellitus screening     ICD-10-CM: Z13.1  ICD-9-CM: V77.1     Screening for deficiency anemia     ICD-10-CM: Z13.0  ICD-9-CM:  V78.1     Lipid screening     ICD-10-CM: Z13.220  ICD-9-CM: V77.91     Need for hepatitis C screening test     ICD-10-CM: Z11.59  ICD-9-CM: V73.89     ADHD (attention deficit hyperactivity disorder) evaluation     ICD-10-CM: Z13.39  ICD-9-CM: V79.8     Class 3 severe obesity due to excess calories with serious comorbidity and body mass index (BMI) of 50.0 to 59.9 in adult     ICD-10-CM: E66.01, Z68.43  ICD-9-CM: 278.01, V85.43     Psoriasis     ICD-10-CM: L40.9  ICD-9-CM: 696.1     Poor concentration     ICD-10-CM: R41.840  ICD-9-CM: 799.51             Assessment & Plan  1. Obesity.  Dietary modifications and increased physical activity were recommended. He mentioned starting to work out at the gym, lifting weights. Class 3 Severe Obesity (BMI >=40). Obesity-related health conditions include the following: none. Obesity is newly identified. BMI is is above average; BMI management plan is completed. We discussed portion control and increasing exercise.      2. Poor concentration.  He has a previous diagnosis of ADHD. An EKG and urine drug screen will be performed. A trial of Strattera will be initiated, as he has previously taken Wellbutrin in the past with poor results. Nonstimulant medications were discussed as the first option due to fewer side effects and lower addictive potential.    3. Psoriasis.  A referral to dermatology will be made for further evaluation. He reported previous treatments with creams that were ineffective.    4. Sleep disturbances.  He reported difficulty falling asleep due to a racing mind. Non-pharmacological approaches, such as relaxation techniques, were discussed.    5. Increased risk of diabetes.  An A1c test will be performed to evaluate average blood sugar levels. He was mildly anemic during a previous ER visit, and his cholesterol levels will also be checked.     6. History of methamphetamine use.  He reported last use 6 years ago. Nonstimulant medications were chosen to avoid  potential relapse.    Follow-up  He will follow up in about a month to assess the effectiveness of the current treatment and make any necessary adjustments.               Transcribed from ambient dictation for Sharla Ramachandran MD by Sharla Ramachandran MD.  08/16/24   14:04 CDT    Patient or patient representative verbalized consent for the use of Ambient Listening during the visit with  Sharla Ramachandran MD for chart documentation. 8/16/2024  16:16 CDT          This document has been electronically signed by Sharla Ramachandran MD on August 16, 2024 16:16 CDT

## 2024-08-20 ENCOUNTER — LAB (OUTPATIENT)
Dept: LAB | Facility: HOSPITAL | Age: 34
End: 2024-08-20
Payer: COMMERCIAL

## 2024-08-20 DIAGNOSIS — Z13.1 DIABETES MELLITUS SCREENING: ICD-10-CM

## 2024-08-20 DIAGNOSIS — Z11.59 NEED FOR HEPATITIS C SCREENING TEST: ICD-10-CM

## 2024-08-20 DIAGNOSIS — Z13.39 ADHD (ATTENTION DEFICIT HYPERACTIVITY DISORDER) EVALUATION: ICD-10-CM

## 2024-08-20 DIAGNOSIS — Z13.0 SCREENING FOR DEFICIENCY ANEMIA: ICD-10-CM

## 2024-08-20 DIAGNOSIS — Z13.220 LIPID SCREENING: ICD-10-CM

## 2024-08-20 LAB
ALBUMIN SERPL-MCNC: 4.4 G/DL (ref 3.5–5)
ALBUMIN/GLOB SERPL: 1.1 G/DL (ref 1.1–2.5)
ALP SERPL-CCNC: 75 U/L (ref 24–120)
ALT SERPL W P-5'-P-CCNC: 32 U/L (ref 0–50)
ANION GAP SERPL CALCULATED.3IONS-SCNC: 5 MMOL/L (ref 4–13)
AST SERPL-CCNC: 26 U/L (ref 7–45)
BILIRUB SERPL-MCNC: 0.5 MG/DL (ref 0.1–1)
BUN SERPL-MCNC: 7 MG/DL (ref 5–21)
BUN/CREAT SERPL: 9.6
CALCIUM SPEC-SCNC: 8.8 MG/DL (ref 8.6–10.5)
CHLORIDE SERPL-SCNC: 104 MMOL/L (ref 98–110)
CHOLEST SERPL-MCNC: 89 MG/DL (ref 130–200)
CO2 SERPL-SCNC: 28 MMOL/L (ref 24–31)
CREAT SERPL-MCNC: 0.73 MG/DL (ref 0.5–1.4)
EGFRCR SERPLBLD CKD-EPI 2021: 124 ML/MIN/1.73
ERYTHROCYTE [DISTWIDTH] IN BLOOD BY AUTOMATED COUNT: 13.1 % (ref 12.3–15.4)
GLOBULIN UR ELPH-MCNC: 4 GM/DL
GLUCOSE SERPL-MCNC: 123 MG/DL (ref 70–100)
HCT VFR BLD AUTO: 40.3 % (ref 37.5–51)
HCV AB SER QL: NORMAL
HDLC SERPL-MCNC: 14 MG/DL
HGB BLD-MCNC: 13 G/DL (ref 13–17.7)
LDLC SERPL CALC-MCNC: 26 MG/DL (ref 0–99)
LDLC/HDLC SERPL: 0.53 {RATIO}
MCH RBC QN AUTO: 26.6 PG (ref 26.6–33)
MCHC RBC AUTO-ENTMCNC: 32.3 G/DL (ref 31.5–35.7)
MCV RBC AUTO: 82.4 FL (ref 79–97)
PLATELET # BLD AUTO: 354 10*3/MM3 (ref 140–450)
PMV BLD AUTO: 7.7 FL (ref 6–12)
POTASSIUM SERPL-SCNC: 3.7 MMOL/L (ref 3.5–5.3)
PROT SERPL-MCNC: 8.4 G/DL (ref 6.3–8.7)
RBC # BLD AUTO: 4.89 10*6/MM3 (ref 4.14–5.8)
SODIUM SERPL-SCNC: 137 MMOL/L (ref 135–145)
TRIGL SERPL-MCNC: 338 MG/DL (ref 0–149)
VLDLC SERPL-MCNC: 49 MG/DL (ref 5–40)
WBC NRBC COR # BLD AUTO: 9.3 10*3/MM3 (ref 3.4–10.8)

## 2024-08-20 PROCEDURE — G0481 DRUG TEST DEF 8-14 CLASSES: HCPCS

## 2024-08-20 PROCEDURE — 85027 COMPLETE CBC AUTOMATED: CPT

## 2024-08-20 PROCEDURE — 80061 LIPID PANEL: CPT

## 2024-08-20 PROCEDURE — 36415 COLL VENOUS BLD VENIPUNCTURE: CPT

## 2024-08-20 PROCEDURE — 86803 HEPATITIS C AB TEST: CPT

## 2024-08-20 PROCEDURE — 80053 COMPREHEN METABOLIC PANEL: CPT

## 2024-08-20 PROCEDURE — 80307 DRUG TEST PRSMV CHEM ANLYZR: CPT

## 2024-08-26 LAB — DRUGS UR: NORMAL

## 2024-09-16 ENCOUNTER — OFFICE VISIT (OUTPATIENT)
Dept: FAMILY MEDICINE CLINIC | Facility: CLINIC | Age: 34
End: 2024-09-16
Payer: COMMERCIAL

## 2024-09-16 VITALS
DIASTOLIC BLOOD PRESSURE: 82 MMHG | RESPIRATION RATE: 18 BRPM | BODY MASS INDEX: 46.65 KG/M2 | WEIGHT: 315 LBS | HEIGHT: 69 IN | OXYGEN SATURATION: 98 % | HEART RATE: 76 BPM | SYSTOLIC BLOOD PRESSURE: 142 MMHG | TEMPERATURE: 97.8 F

## 2024-09-16 VITALS
DIASTOLIC BLOOD PRESSURE: 82 MMHG | OXYGEN SATURATION: 98 % | HEIGHT: 69 IN | TEMPERATURE: 97.8 F | WEIGHT: 315 LBS | HEART RATE: 76 BPM | SYSTOLIC BLOOD PRESSURE: 142 MMHG | BODY MASS INDEX: 46.65 KG/M2 | RESPIRATION RATE: 18 BRPM

## 2024-09-16 DIAGNOSIS — F90.2 ATTENTION DEFICIT HYPERACTIVITY DISORDER (ADHD), COMBINED TYPE: Primary | ICD-10-CM

## 2024-09-16 DIAGNOSIS — R03.0 ELEVATED BLOOD PRESSURE READING: ICD-10-CM

## 2024-09-16 DIAGNOSIS — L40.9 PSORIASIS: ICD-10-CM

## 2024-09-16 DIAGNOSIS — Z00.00 WELL ADULT EXAM: Primary | ICD-10-CM

## 2024-09-16 DIAGNOSIS — R41.840 POOR CONCENTRATION: ICD-10-CM

## 2024-09-16 DIAGNOSIS — E66.01 CLASS 3 SEVERE OBESITY DUE TO EXCESS CALORIES WITH SERIOUS COMORBIDITY AND BODY MASS INDEX (BMI) OF 50.0 TO 59.9 IN ADULT: ICD-10-CM

## 2024-09-16 PROCEDURE — 99214 OFFICE O/P EST MOD 30 MIN: CPT | Performed by: FAMILY MEDICINE

## 2024-09-16 PROCEDURE — 99395 PREV VISIT EST AGE 18-39: CPT | Performed by: FAMILY MEDICINE

## 2024-09-16 RX ORDER — ATOMOXETINE 80 MG/1
80 CAPSULE ORAL DAILY
Qty: 30 CAPSULE | Refills: 2 | Status: SHIPPED | OUTPATIENT
Start: 2024-09-16

## 2024-10-01 NOTE — PROGRESS NOTES
"Subjective   Jose Alejandro Edwards is a 34 y.o. male who presents for adult well exam.  Discussed BP and monitoring closely   Advised on weight and diet/lifestyle changes   Reviewed labs   No need for colon screening at this time   Reviewed immunizations     History of Present Illness      Results      The following portions of the patient's history were reviewed and updated as appropriate: allergies, current medications, past family history, past medical history, past social history, past surgical history, and problem list.        A review of systems was performed, and pertinent findings are noted in the HPI.    Objective   /82 (BP Location: Right arm, Patient Position: Sitting, Cuff Size: Large Adult)   Pulse 76   Temp 97.8 °F (36.6 °C) (Infrared)   Resp 18   Ht 175.3 cm (69\")   Wt (!) 171 kg (378 lb)   SpO2 98%   BMI 55.82 kg/m²          Physical Exam  Vitals and nursing note reviewed.   Constitutional:       General: He is not in acute distress.     Appearance: He is well-developed. He is obese. He is not diaphoretic.   HENT:      Head: Normocephalic and atraumatic.      Right Ear: External ear normal.      Left Ear: External ear normal.      Nose: Nose normal.   Eyes:      General:         Right eye: No discharge.         Left eye: No discharge.      Conjunctiva/sclera: Conjunctivae normal.   Neck:      Thyroid: No thyromegaly.      Trachea: No tracheal deviation.   Cardiovascular:      Rate and Rhythm: Normal rate and regular rhythm.      Heart sounds: Normal heart sounds.   Pulmonary:      Effort: Pulmonary effort is normal. No respiratory distress.      Breath sounds: Normal breath sounds. No stridor. No wheezing.   Chest:      Chest wall: No tenderness.   Musculoskeletal:         General: Normal range of motion.      Cervical back: Normal range of motion.   Lymphadenopathy:      Cervical: No cervical adenopathy.   Skin:     General: Skin is warm and dry.   Neurological:      Mental Status: " He is alert and oriented to person, place, and time.      Motor: No abnormal muscle tone.      Coordination: Coordination normal.   Psychiatric:         Behavior: Behavior normal.         Thought Content: Thought content normal.         Judgment: Judgment normal.     Assessment & Plan   Problems Addressed this Visit    None  Visit Diagnoses       Well adult exam    -  Primary          Diagnoses         Codes Comments    Well adult exam    -  Primary ICD-10-CM: Z00.00  ICD-9-CM: V70.0             Assessment & Plan    Reviewed immunizations   Vitals okay - advised to monitor HTN   Reviewed labs   Advised on healthy diet/lifestyle changes   Return in 1 year for wellness visit              Transcribed from ambient dictation for Sharla Ramachandran MD by Sharla Ramachandran MD.  09/30/24   21:22 CDT    Patient or patient representative verbalized consent for the use of Ambient Listening during the visit with  Sharla Ramachandran MD for chart documentation. 10/5/2024  04:00 CDT          This document has been electronically signed by Sharla Ramachandran MD on October 5, 2024 04:00 CDT

## 2024-10-11 ENCOUNTER — TELEPHONE (OUTPATIENT)
Dept: FAMILY MEDICINE CLINIC | Facility: CLINIC | Age: 34
End: 2024-10-11

## 2024-10-11 ENCOUNTER — OFFICE VISIT (OUTPATIENT)
Dept: FAMILY MEDICINE CLINIC | Facility: CLINIC | Age: 34
End: 2024-10-11
Payer: COMMERCIAL

## 2024-10-11 VITALS
WEIGHT: 315 LBS | HEIGHT: 69 IN | HEART RATE: 102 BPM | OXYGEN SATURATION: 99 % | BODY MASS INDEX: 46.65 KG/M2 | DIASTOLIC BLOOD PRESSURE: 79 MMHG | TEMPERATURE: 98.1 F | SYSTOLIC BLOOD PRESSURE: 122 MMHG

## 2024-10-11 DIAGNOSIS — E66.01 CLASS 3 SEVERE OBESITY DUE TO EXCESS CALORIES WITH SERIOUS COMORBIDITY AND BODY MASS INDEX (BMI) OF 50.0 TO 59.9 IN ADULT: ICD-10-CM

## 2024-10-11 DIAGNOSIS — E66.813 CLASS 3 SEVERE OBESITY DUE TO EXCESS CALORIES WITH SERIOUS COMORBIDITY AND BODY MASS INDEX (BMI) OF 50.0 TO 59.9 IN ADULT: ICD-10-CM

## 2024-10-11 DIAGNOSIS — F90.2 ATTENTION DEFICIT HYPERACTIVITY DISORDER (ADHD), COMBINED TYPE: Primary | ICD-10-CM

## 2024-10-11 DIAGNOSIS — Z23 FLU VACCINE NEED: ICD-10-CM

## 2024-10-11 DIAGNOSIS — I10 PRIMARY HYPERTENSION: ICD-10-CM

## 2024-10-11 PROCEDURE — 99214 OFFICE O/P EST MOD 30 MIN: CPT | Performed by: FAMILY MEDICINE

## 2024-10-11 RX ORDER — VILOXAZINE HYDROCHLORIDE 200 MG/1
200 CAPSULE, EXTENDED RELEASE ORAL DAILY
Qty: 30 CAPSULE | Refills: 0 | Status: SHIPPED | OUTPATIENT
Start: 2024-10-11

## 2024-10-11 RX ORDER — APREMILAST 30 MG/1
TABLET, FILM COATED ORAL
COMMUNITY

## 2024-10-11 NOTE — TELEPHONE ENCOUNTER
PATIENT CALLED IN STATING HE NEEDS A PRIOR AUTHORIZATION FOR THE     Viloxazine HCl ER (Qelbree) 200 MG capsule sustained-release 24 hr     GOOD CALL BACK ONCE APPROVED   [phone]   Name band;

## 2024-10-11 NOTE — PROGRESS NOTES
"Subjective   Jose Alejandro Edwards is a 34 y.o. male.     History of Present Illness  The patient presents for evaluation of multiple medical concerns.    He has been monitoring his blood pressure, which was slightly elevated during the last visit. He is not currently on any medication for hypertension.    He reports no significant improvement in his ADHD symptoms with the use of Strattera 80 mg daily. He has previously tried BuSpar and Vyvanse for the same condition.    Results      The following portions of the patient's history were reviewed and updated as appropriate: allergies, current medications, past family history, past medical history, past social history, past surgical history, and problem list.        A review of systems was performed, and pertinent findings are noted in the HPI.    Objective   /79   Pulse 102   Temp 98.1 °F (36.7 °C)   Ht 175.3 cm (69\")   Wt (!) 171 kg (376 lb)   SpO2 99%   BMI 55.53 kg/m²          Physical Exam  Vitals and nursing note reviewed.   Constitutional:       Appearance: Normal appearance. He is obese.   HENT:      Right Ear: External ear normal.      Left Ear: External ear normal.      Nose: Nose normal.   Eyes:      General:         Right eye: No discharge.         Left eye: No discharge.      Conjunctiva/sclera: Conjunctivae normal.   Skin:     General: Skin is warm and dry.   Neurological:      Mental Status: He is alert and oriented to person, place, and time. Mental status is at baseline.   Psychiatric:         Mood and Affect: Mood normal.         Behavior: Behavior normal.         Thought Content: Thought content normal.         Judgment: Judgment normal.         Assessment & Plan   Problems Addressed this Visit          Endocrine and Metabolic    Class 3 severe obesity due to excess calories with serious comorbidity and body mass index (BMI) of 50.0 to 59.9 in adult     Other Visit Diagnoses       Attention deficit hyperactivity disorder (ADHD), " combined type    -  Primary    Relevant Medications    Viloxazine HCl ER (Qelbree) 200 MG capsule sustained-release 24 hr    Flu vaccine need        Primary hypertension              Diagnoses         Codes Comments    Attention deficit hyperactivity disorder (ADHD), combined type    -  Primary ICD-10-CM: F90.2  ICD-9-CM: 314.01     Flu vaccine need     ICD-10-CM: Z23  ICD-9-CM: V04.81     Class 3 severe obesity due to excess calories with serious comorbidity and body mass index (BMI) of 50.0 to 59.9 in adult     ICD-10-CM: E66.813, Z68.43, E66.01  ICD-9-CM: 278.01, V85.43     Primary hypertension     ICD-10-CM: I10  ICD-9-CM: 401.9             Assessment & Plan  1. Attention Deficit Hyperactivity Disorder (ADHD).  Chronic, uncontrolled. Strattera 80 mg will be discontinued as he does not find it effective. A trial of Qelbree will be initiated to help better control his symptoms. This medication is a nonstimulant and should not impact his blood pressure. The prescription will be sent to [facility]. If Qelbree is not effective, clonidine may be considered as it can help with both blood pressure and concentration. If nonstimulant options are exhausted, stimulant medications such as Concerta or Adderall may be considered, but these require close monitoring of blood pressure due to higher risk.    2. Hypertension.  His blood pressure has been elevated but is well controlled at today's visit (122/79). He has been monitoring it, and readings are improving. Advised to continue monitoring his blood pressure and maintain diet and lifestyle changes, including regular exercise and reducing salt intake. If blood pressure readings consistently reach 140s or higher, blood pressure medication may be considered.    3. Obesity.  Encouraged to continue with a healthy diet and lifestyle changes, including regular exercise such as walking, swimming, and bicycling.                 Transcribed from ambient dictation for Sharla  Jeannine Ramachandran MD by Sharla Ramachandran MD.  10/11/24   11:18 CDT    Patient or patient representative verbalized consent for the use of Ambient Listening during the visit with  Sharla Ramachandran MD for chart documentation. 10/11/2024  11:18 CDT          This document has been electronically signed by Sharla Ramachandran MD on October 11, 2024 13:10 CDT

## 2024-10-16 NOTE — TELEPHONE ENCOUNTER
Caller: Jose Alejandro Edwards    Relationship: Self    Best call back number: [phone]     What is the best time to reach you: ANY    Who are you requesting to speak with (clinical staff, provider,  specific staff member): NONE SPECIFIED     What was the call regarding:     PATIENT WOULD LIKE TO CHECK ON THE STATUS OF THIS REQUEST. PATIENT STATES HE RECEIVED A MESSAGE FROM [employer] THAT AN ALTERNATIVE MEDICATION FROM HIS PROVIDER WAS BEING CONSIDERED. PATIENT IS WONDERING IF HIS PROVIDER IS GOING TO PRESCRIBE AN ALTERNATIVE MEDICATION OR IF THERE IS AN UPDATE ON THE QELBREE PRIOR AUTHORIZATION.     Is it okay if the provider responds through [employer]: PLEASE CALL

## 2024-11-24 DIAGNOSIS — F90.2 ATTENTION DEFICIT HYPERACTIVITY DISORDER (ADHD), COMBINED TYPE: ICD-10-CM

## 2024-11-25 DIAGNOSIS — F90.2 ATTENTION DEFICIT HYPERACTIVITY DISORDER (ADHD), COMBINED TYPE: ICD-10-CM

## 2024-11-25 NOTE — TELEPHONE ENCOUNTER
Rx Refill Note  Requested Prescriptions     Pending Prescriptions Disp Refills    Qelbree 200 MG capsule sustained-release 24 hr [Pharmacy Med Name: QELBREE  MG CAPSULE] 30 capsule 0     Sig: TAKE 1 CAPSULE BY MOUTH EVERY DAY      Last office visit with prescribing clinician: 10/11/2024   Last telemedicine visit with prescribing clinician: Visit date not found   Next office visit with prescribing clinician: 12/3/2024                         Would you like a call back once the refill request has been completed: [] Yes [] No    If the office needs to give you a call back, can they leave a voicemail: [] Yes [] No    Fabiola Galindo MA  11/25/24, 08:12 CST

## 2024-11-25 NOTE — TELEPHONE ENCOUNTER
Caller: Lang Edwardsfadibenjamin Carolina    Relationship: Self    Best call back number: [phone]    Requested Prescriptions:   Requested Prescriptions     Pending Prescriptions Disp Refills    Viloxazine HCl ER (Qelbree) 200 MG capsule sustained-release 24 hr 30 capsule 0     Sig: Take 1 capsule by mouth Daily.        Pharmacy where request should be sent: [facility] #[id number] - [place], [place] - [address] [facility] [phone] [facility] [phone]      Last office visit with prescribing clinician: 10/11/2024   Last telemedicine visit with prescribing clinician: Visit date not found   Next office visit with prescribing clinician: 12/3/2024     Does the patient have less than a 3 day supply:  [x] Yes  [] No    Would you like a call back once the refill request has been completed: [x] Yes [] No    If the office needs to give you a call back, can they leave a voicemail: [x] Yes [] No    Mayi Sandoval Rep   11/25/24 10:49 CST

## 2024-11-26 NOTE — TELEPHONE ENCOUNTER
Rx Refill Note  Requested Prescriptions     Pending Prescriptions Disp Refills    Viloxazine HCl ER (Qelbree) 200 MG capsule sustained-release 24 hr 30 capsule 0     Sig: Take 1 capsule by mouth Daily.      Last office visit with prescribing clinician: 10/11/2024   Last telemedicine visit with prescribing clinician: Visit date not found   Next office visit with prescribing clinician: 12/3/2024     Uds - 08-20-24  CSA -  none on file        Lucy Joseph MA  11/26/24, 13:00 CST

## 2024-11-27 RX ORDER — VILOXAZINE HYDROCHLORIDE 200 MG/1
200 CAPSULE, EXTENDED RELEASE ORAL DAILY
Qty: 30 CAPSULE | Refills: 0 | OUTPATIENT
Start: 2024-11-27

## 2024-11-27 RX ORDER — VILOXAZINE HYDROCHLORIDE 200 MG/1
1 CAPSULE, EXTENDED RELEASE ORAL DAILY
Qty: 30 CAPSULE | Refills: 0 | Status: SHIPPED | OUTPATIENT
Start: 2024-11-27

## 2024-12-04 ENCOUNTER — TELEMEDICINE (OUTPATIENT)
Dept: FAMILY MEDICINE CLINIC | Facility: CLINIC | Age: 34
End: 2024-12-04
Payer: COMMERCIAL

## 2024-12-04 DIAGNOSIS — F90.9 ADULT ADHD: ICD-10-CM

## 2024-12-04 DIAGNOSIS — R51.9 ACUTE NONINTRACTABLE HEADACHE, UNSPECIFIED HEADACHE TYPE: ICD-10-CM

## 2024-12-04 DIAGNOSIS — K59.04 CHRONIC IDIOPATHIC CONSTIPATION: Primary | ICD-10-CM

## 2024-12-04 PROCEDURE — 99214 OFFICE O/P EST MOD 30 MIN: CPT | Performed by: FAMILY MEDICINE

## 2024-12-04 RX ORDER — POLYETHYLENE GLYCOL 3350 17 G/17G
17 POWDER, FOR SOLUTION ORAL DAILY PRN
Qty: 30 EACH | Refills: 2 | Status: SHIPPED | OUTPATIENT
Start: 2024-12-04

## 2024-12-04 RX ORDER — SUMATRIPTAN SUCCINATE 25 MG/1
TABLET ORAL
Qty: 9 TABLET | Refills: 0 | Status: SHIPPED | OUTPATIENT
Start: 2024-12-04

## 2024-12-04 NOTE — PROGRESS NOTES
Subjective   Jose Alejandro Edwards is a 34 y.o. male who presents via mychart video visit for follow up on ADHD.    He is at home, I am in office.     ADHD is controlled on current medciation - he would like to continue on it     He is having headaches     He is also having constipation      History of Present Illness      Results      The following portions of the patient's history were reviewed and updated as appropriate: allergies, current medications, past family history, past medical history, past social history, past surgical history, and problem list.        A review of systems was performed, and pertinent findings are noted in the HPI.    Objective   There were no vitals taken for this visit.         Physical Exam  Constitutional:       Appearance: Normal appearance.   HENT:      Nose: Nose normal.   Eyes:      General:         Right eye: No discharge.         Left eye: No discharge.      Conjunctiva/sclera: Conjunctivae normal.   Pulmonary:      Effort: Pulmonary effort is normal. No respiratory distress.   Neurological:      Mental Status: He is alert and oriented to person, place, and time.   Psychiatric:         Mood and Affect: Mood normal.         Behavior: Behavior normal.         Thought Content: Thought content normal.         Judgment: Judgment normal.         Assessment & Plan   Problems Addressed this Visit    None  Visit Diagnoses       Chronic idiopathic constipation    -  Primary    Relevant Medications    polyethylene glycol (MIRALAX) 17 g packet    Acute nonintractable headache, unspecified headache type        Relevant Medications    SUMAtriptan (Imitrex) 25 MG tablet    Adult ADHD              Diagnoses         Codes Comments    Chronic idiopathic constipation    -  Primary ICD-10-CM: K59.04  ICD-9-CM: 564.00     Acute nonintractable headache, unspecified headache type     ICD-10-CM: R51.9  ICD-9-CM: 784.0     Adult ADHD     ICD-10-CM: F90.9  ICD-9-CM: 314.01             Assessment &  Plan    #1 constipation: new, will start on miralax, advised to increase fiber intake and water intake, return if not improving     #2 ADHD: chornic, controlled, continue on current medication, refill when due, controlled contract in chart, justin DUNCAN reviewed     #3 headache: new, advised on trial of imitrex, return if not improving       15 minutes          Transcribed from ambient dictation for Sharla Ramachandran MD by Sharla Ramachandran MD.  12/04/24   11:29 CST    Patient or patient representative verbalized consent for the use of Ambient Listening during the visit with  Sharla Ramachandran MD for chart documentation. 12/18/2024  20:54 CST          This document has been electronically signed by Sharla Ramachandran MD on December 18, 2024 20:54 CST

## 2025-01-07 DIAGNOSIS — F90.2 ATTENTION DEFICIT HYPERACTIVITY DISORDER (ADHD), COMBINED TYPE: ICD-10-CM

## 2025-01-07 RX ORDER — VILOXAZINE HYDROCHLORIDE 200 MG/1
1 CAPSULE, EXTENDED RELEASE ORAL DAILY
Qty: 30 CAPSULE | Refills: 0 | Status: CANCELLED | OUTPATIENT
Start: 2025-01-07

## 2025-01-07 NOTE — TELEPHONE ENCOUNTER
Caller: Jose Alejandro Edwards    Relationship: Self    Best call back number:   (Self) [phone]       Requested Prescriptions:   Requested Prescriptions     Pending Prescriptions Disp Refills    Viloxazine HCl ER (Qelbree) 200 MG capsule sustained-release 24 hr 30 capsule 0     Sig: Take 1 capsule by mouth Daily.        Pharmacy where request should be sent: [facility] - [place], [place] - [address] [facility] [phone] [facility] [phone]      Last office visit with prescribing clinician: 10/11/2024   Last telemedicine visit with prescribing clinician: 12/4/2024   Next office visit with prescribing clinician: 1/9/2025     Additional details provided by patient: PATIENT IS COMPLETELY OUT    Does the patient have less than a 3 day supply:  [x] Yes  [] No    Would you like a call back once the refill request has been completed: [x] Yes [] No    If the office needs to give you a call back, can they leave a voicemail: [x] Yes [] No    Mayi Germain Rep   01/07/25 11:10 CST

## 2025-01-08 ENCOUNTER — TELEPHONE (OUTPATIENT)
Dept: FAMILY MEDICINE CLINIC | Facility: CLINIC | Age: 35
End: 2025-01-08
Payer: COMMERCIAL

## 2025-01-08 NOTE — TELEPHONE ENCOUNTER
Rx Refill Note  Requested Prescriptions     Pending Prescriptions Disp Refills    Viloxazine HCl ER (Qelbree) 200 MG capsule sustained-release 24 hr 30 capsule 0     Sig: Take 1 capsule by mouth Daily.      Last office visit with prescribing clinician: 10/11/2024     Next office visit with prescribing clinician: 1/8/2025     LRX:11/27/2024-30  UDS/CSA:8/2024        Priscilla Miranda MA  01/08/25, 12:00 CST

## 2025-01-08 NOTE — TELEPHONE ENCOUNTER
Caller: [facility] - [place], [place] - [address] - [phone] [facility] [phone] FX    Relationship: Pharmacy    Best call back number: [phone] ASK FOR EZIO    Requested Prescriptions:     Qelbree 200 MG capsule sustained-release 24 hr   Pharmacy where request should be sent: [facility] - [place], [place] - [address] - [phone] [facility] [phone] FX     Last office visit with prescribing clinician: 10/11/2024   Last telemedicine visit with prescribing clinician: 12/4/2024   Next office visit with prescribing clinician: 1/9/2025     Additional details provided by patient: PHARMACY CALLED THIS IN ON BEHALF OF PATIENT    Does the patient have less than a 3 day supply:  [x] Yes  [] No    Would you like a call back once the refill request has been completed: [x] Yes [] No    If the office needs to give you a call back, can they leave a voicemail: [x] Yes [] No    Mayi Frost Rep   01/08/25 11:54 CST

## 2025-01-09 ENCOUNTER — NURSE TRIAGE (OUTPATIENT)
Dept: CALL CENTER | Facility: HOSPITAL | Age: 35
End: 2025-01-09
Payer: COMMERCIAL

## 2025-01-09 ENCOUNTER — TELEMEDICINE (OUTPATIENT)
Dept: FAMILY MEDICINE CLINIC | Facility: CLINIC | Age: 35
End: 2025-01-09
Payer: COMMERCIAL

## 2025-01-09 DIAGNOSIS — F90.2 ATTENTION DEFICIT HYPERACTIVITY DISORDER (ADHD), COMBINED TYPE: ICD-10-CM

## 2025-01-09 DIAGNOSIS — F90.2 ATTENTION DEFICIT HYPERACTIVITY DISORDER, COMBINED TYPE: Primary | ICD-10-CM

## 2025-01-09 DIAGNOSIS — F90.2 ATTENTION DEFICIT HYPERACTIVITY DISORDER (ADHD), COMBINED TYPE: Primary | ICD-10-CM

## 2025-01-09 PROCEDURE — 99214 OFFICE O/P EST MOD 30 MIN: CPT | Performed by: FAMILY MEDICINE

## 2025-01-09 RX ORDER — LISDEXAMFETAMINE DIMESYLATE 30 MG/1
30 CAPSULE ORAL EVERY MORNING
Qty: 30 CAPSULE | Refills: 0 | OUTPATIENT
Start: 2025-01-09

## 2025-01-09 RX ORDER — VILOXAZINE HYDROCHLORIDE 200 MG/1
1 CAPSULE, EXTENDED RELEASE ORAL DAILY
Qty: 30 CAPSULE | Refills: 0 | OUTPATIENT
Start: 2025-01-09

## 2025-01-09 RX ORDER — LISDEXAMFETAMINE DIMESYLATE 30 MG/1
30 CAPSULE ORAL EVERY MORNING
Qty: 30 CAPSULE | Refills: 0 | Status: SHIPPED | OUTPATIENT
Start: 2025-01-09

## 2025-01-09 NOTE — TELEPHONE ENCOUNTER
Caller: Jose Alejandro Edwards    Relationship: Self    Best call back number: [phone]     Requested Prescriptions:   Requested Prescriptions     Pending Prescriptions Disp Refills    lisdexamfetamine (Vyvanse) 30 MG capsule 30 capsule 0     Sig: Take 1 capsule by mouth Every Morning        Pharmacy where request should be sent: [id number] [place] [place] - [address] AT [address]OSS FROM [facility] [phone] [facility] [phone]      Last office visit with prescribing clinician: 10/11/2024   Last telemedicine visit with prescribing clinician: 1/9/2025   Next office visit with prescribing clinician: 9/23/2025     Additional details provided by patient: PATIENT IS OUT    Does the patient have less than a 3 day supply:  [x] Yes  [] No    Would you like a call back once the refill request has been completed: [x] Yes [] No    If the office needs to give you a call back, can they leave a voicemail: [x] Yes [] No    Mayi Germain Rep   01/09/25 14:45 CST

## 2025-01-09 NOTE — TELEPHONE ENCOUNTER
Checking if med  Vyvanse, sent to Pharmacy, uses [facility], he says is new prescription . Was called to [facility] first was to high 190.00 , is only 70.00 at [facility]. Spoke to Priscilla at [facility] office she will get Dr. Ramachandran to resend it to [facility].pt. told may take a bit for it to come thru. He verbalized understanding.   Reason for Disposition   [1] Caller has NON-URGENT medicine question about med that PCP prescribed AND [2] triager unable to answer question    Additional Information   Negative: [1] Intentional drug overdose AND [2] suicidal thoughts or ideas   Negative: Drug overdose and triager unable to answer question   Negative: Caller requesting a renewal or refill of a medicine patient is currently taking   Negative: Caller requesting information unrelated to medicine   Negative: Caller requesting information about COVID-19 Vaccine   Negative: Caller requesting information about Emergency Contraception   Negative: Caller requesting information about Combined Birth Control Pills   Negative: Caller requesting information about Progestin Birth Control Pills   Negative: Caller requesting information about Post-Op pain or medicines   Negative: Caller requesting a prescription antibiotic (such as Penicillin) for Strep throat and has a positive culture result   Negative: Caller requesting a prescription anti-viral med (such as Tamiflu) and has influenza (flu) symptoms   Negative: Immunization reaction suspected   Negative: Rash while taking a medicine or within 3 days of stopping it   Negative: [1] Asthma and [2] having symptoms of asthma (cough, wheezing, etc.)   Negative: [1] Symptom of illness (e.g., headache, abdominal pain, earache, vomiting) AND [2] more than mild   Negative: Breastfeeding questions about mother's medicines and diet   Negative: MORE THAN A DOUBLE DOSE of a prescription or over-the-counter (OTC) drug   Negative: [1] DOUBLE DOSE (an extra dose or lesser amount) of  "prescription drug AND [2] any symptoms (e.g., dizziness, nausea, pain, sleepiness)   Negative: [1] DOUBLE DOSE (an extra dose or lesser amount) of over-the-counter (OTC) drug AND [2] any symptoms (e.g., dizziness, nausea, pain, sleepiness)   Negative: Took another person's prescription drug   Negative: [1] DOUBLE DOSE (an extra dose or lesser amount) of prescription drug AND [2] NO symptoms  (Exception: A double dose of antibiotics.)   Negative: Diabetes drug error or overdose (e.g., took wrong type of insulin or took extra dose)   Negative: [1] Prescription not at pharmacy AND [2] was prescribed by PCP recently (Exception: Triager has access to EMR and prescription is recorded there. Go to Home Care and confirm for pharmacy.)   Negative: [1] Pharmacy calling with prescription question AND [2] triager unable to answer question   Negative: [1] Caller has URGENT medicine question about med that PCP or specialist prescribed AND [2] triager unable to answer question   Negative: Medicine patch causing local rash or itching   Negative: [1] Caller has medicine question about med NOT prescribed by PCP AND [2] triager unable to answer question (e.g., compatibility with other med, storage)   Negative: Prescription request for new medicine (not a refill)    Answer Assessment - Initial Assessment Questions  1. NAME of MEDICINE: \"What medicine(s) are you calling about?\"      Has VyvPLUQe been sent to [facility] yet  2. QUESTION: \"What is your question?\" (e.g., double dose of medicine, side effect)      Has it been sent  3. PRESCRIBER: \"Who prescribed the medicine?\" Reason: if prescribed by specialist, call should be referred to that group.      Dr. Ramachandran  4. SYMPTOMS: \"Do you have any symptoms?\" If Yes, ask: \"What symptoms are you having?\"  \"How bad are the symptoms (e.g., mild, moderate, severe)      ADHD  5. PREGNANCY:  \"Is there any chance that you are pregnant?\" \"When was your last menstrual period?\"      no    Protocols used: " Medication Question Call-ADULT-AH

## 2025-01-09 NOTE — PROGRESS NOTES
Subjective   Jose Alejandro Edwards is a 34 y.o. male who presents via mychart video visit for follow up on ADHD.  He is at work, I am in office.   He does not feel the qelbree is working   Has taken vyvanse in the past and feels it did better   EKG was normal in August   UDS normal in August      History of Present Illness      Results      The following portions of the patient's history were reviewed and updated as appropriate: allergies, current medications, past family history, past medical history, past social history, past surgical history, and problem list.        A review of systems was performed, and pertinent findings are noted in the HPI.    Objective   There were no vitals taken for this visit.         Physical Exam  Constitutional:       General: He is not in acute distress.     Appearance: Normal appearance. He is not ill-appearing or toxic-appearing.   Pulmonary:      Effort: Pulmonary effort is normal. No respiratory distress.   Neurological:      Mental Status: He is alert and oriented to person, place, and time.   Psychiatric:         Mood and Affect: Mood normal.         Behavior: Behavior normal.         Thought Content: Thought content normal.         Judgment: Judgment normal.         Assessment & Plan   Problems Addressed this Visit    None  Visit Diagnoses       Attention deficit hyperactivity disorder (ADHD), combined type    -  Primary    Relevant Medications    lisdexamfetamine (Vyvanse) 30 MG capsule          Diagnoses         Codes Comments    Attention deficit hyperactivity disorder (ADHD), combined type    -  Primary ICD-10-CM: F90.2  ICD-9-CM: 314.01             Assessment & Plan        Dc qelbree   Trial vyvanse   UDS CARSON Sorensen reviewed   Controlled contract to be signed when physically in office   Recheck in 1 month   Advised on risks/benefits     15 minutes     Transcribed from ambient dictation for Sharla Ramachandran MD by Sharla Ramachandran MD.  01/09/25   08:26  CST    Patient or patient representative verbalized consent for the use of Ambient Listening during the visit with  Sharla Ramachandran MD for chart documentation. 1/9/2025  08:26 CST

## 2025-01-09 NOTE — TELEPHONE ENCOUNTER
Caller: Jose Alejandro Edwards    Relationship: Self    Best call back number:    [phone]        Requested Prescriptions:   Requested Prescriptions     Pending Prescriptions Disp Refills    lisdexamfetamine (Vyvanse) 30 MG capsule 30 capsule 0     Sig: Take 1 capsule by mouth Every Morning        Pharmacy where request should be sent: [id number] [place] - [address] AT [address]OSS FROM [facility] [phone] [facility] [phone]      Last office visit with prescribing clinician: 10/11/2024   Last telemedicine visit with prescribing clinician: 1/9/2025   Next office visit with prescribing clinician: 9/23/2025     Additional details provided by patient: NEW SCRIPT    Does the patient have less than a 3 day supply:  [x] Yes  [] No    Would you like a call back once the refill request has been completed: [x] Yes [] No    If the office needs to give you a call back, can they leave a voicemail: [x] Yes [] No    Mayi Frost Rep   01/09/25 11:53 CST

## 2025-01-10 RX ORDER — VILOXAZINE HYDROCHLORIDE 200 MG/1
1 CAPSULE, EXTENDED RELEASE ORAL DAILY
Qty: 30 CAPSULE | Refills: 0 | OUTPATIENT
Start: 2025-01-10

## 2025-01-13 NOTE — TELEPHONE ENCOUNTER
Called [facility] to cancel generic vyvanse and reports that pt has already picked up med.   Called pt back to follow up- pt reports that generic vyvanse was discussed and was to be called in, pt just wanted to  at [facility] because it was cheaper. Pt reports that he picked up at [facility] due to weather, however next fill would like for rx to be sent to [facility]. Note made on sticky notes for reminder.

## 2025-02-07 ENCOUNTER — OFFICE VISIT (OUTPATIENT)
Dept: FAMILY MEDICINE CLINIC | Facility: CLINIC | Age: 35
End: 2025-02-07
Payer: COMMERCIAL

## 2025-02-07 VITALS
SYSTOLIC BLOOD PRESSURE: 130 MMHG | BODY MASS INDEX: 46.65 KG/M2 | HEART RATE: 81 BPM | WEIGHT: 315 LBS | RESPIRATION RATE: 20 BRPM | HEIGHT: 69 IN | OXYGEN SATURATION: 100 % | DIASTOLIC BLOOD PRESSURE: 62 MMHG | TEMPERATURE: 99.1 F

## 2025-02-07 DIAGNOSIS — F90.2 ATTENTION DEFICIT HYPERACTIVITY DISORDER (ADHD), COMBINED TYPE: ICD-10-CM

## 2025-02-07 PROCEDURE — 99214 OFFICE O/P EST MOD 30 MIN: CPT | Performed by: FAMILY MEDICINE

## 2025-02-07 RX ORDER — LISDEXAMFETAMINE DIMESYLATE 40 MG/1
40 CAPSULE ORAL EVERY MORNING
Qty: 30 CAPSULE | Refills: 0 | Status: SHIPPED | OUTPATIENT
Start: 2025-02-07

## 2025-02-07 NOTE — PROGRESS NOTES
"Subjective   Jose Alejandro Edwards is a 34 y.o. male.     History of Present Illness  The patient presents for a follow-up on ADHD.    She reports that her current medication regimen, Vyvanse 30 mg, has been effective in managing her symptoms. However, she notes that the effects seem to diminish around 11:30 or 12:00.    MEDICATIONS  Vyvanse    IMMUNIZATIONS  She declined influenza vaccine.    Results      The following portions of the patient's history were reviewed and updated as appropriate: allergies, current medications, past family history, past medical history, past social history, past surgical history, and problem list.        A review of systems was performed, and pertinent findings are noted in the HPI.    Objective   /62 (BP Location: Right arm, Patient Position: Sitting, Cuff Size: Large Adult)   Pulse 81   Temp 99.1 °F (37.3 °C) (Infrared)   Resp 20   Ht 175.3 cm (69\")   Wt (!) 165 kg (364 lb)   SpO2 100%   BMI 53.75 kg/m²          Physical Exam  Vitals and nursing note reviewed.   Constitutional:       General: He is not in acute distress.     Appearance: Normal appearance. He is obese. He is not toxic-appearing.   HENT:      Head: Normocephalic and atraumatic.      Right Ear: External ear normal.      Left Ear: External ear normal.      Nose: Nose normal.   Eyes:      General:         Right eye: No discharge.         Left eye: No discharge.      Conjunctiva/sclera: Conjunctivae normal.   Cardiovascular:      Rate and Rhythm: Regular rhythm.      Pulses: Normal pulses.   Pulmonary:      Effort: Pulmonary effort is normal. No respiratory distress.   Skin:     General: Skin is warm and dry.   Neurological:      Mental Status: He is alert and oriented to person, place, and time.   Psychiatric:         Mood and Affect: Mood normal.         Behavior: Behavior normal.         Thought Content: Thought content normal.         Judgment: Judgment normal.         Assessment & Plan   Problems " Addressed this Visit    None  Visit Diagnoses       Attention deficit hyperactivity disorder (ADHD), combined type        Relevant Medications    lisdexamfetamine (Vyvanse) 40 MG capsule          Diagnoses         Codes Comments    Attention deficit hyperactivity disorder (ADHD), combined type     ICD-10-CM: F90.2  ICD-9-CM: 314.01             Assessment & Plan  1. Attention deficit hyperactivity disorder (ADHD).  The patient's current medication, Vyvanse 30 mg, is not lasting through the day. The dosage of Vyvanse will be increased from 30 mg to 40 mg. She is advised to try this new dosage over the next month. The prescription has been sent to [facility] pharmacy. If the increased dosage is effective by the end of the month, she will continue with it, and a follow-up appointment will be scheduled in 3 months. If the increased dosage is not effective, she should return at the end of the month for further adjustments.               Transcribed from ambient dictation for Sharla Ramachandran MD by Sharla Ramachandran MD.  02/07/25   11:31 CST    Patient or patient representative verbalized consent for the use of Ambient Listening during the visit with  Sharla Ramachandran MD for chart documentation. 2/7/2025  12:29 CST          This document has been electronically signed by Sharla Ramachandran MD on February 7, 2025 12:29 CST

## 2025-02-28 ENCOUNTER — TELEPHONE (OUTPATIENT)
Dept: FAMILY MEDICINE CLINIC | Facility: CLINIC | Age: 35
End: 2025-02-28

## 2025-02-28 NOTE — TELEPHONE ENCOUNTER
Caller: Jose Alejandro Edwards    Relationship: Self    Best call back number:  [phone]    Which medication are you concerned about:     lisdexamfetamine (Vyvanse) 40 MG capsule     Who prescribed you this medication:     DR GALVAN    What are your concerns:     DR GALVAN ASKED PATIENT TO CALL HER AND LET HER KNOW HOW THE MEDICATION IS DOING. PATIENT STATES IT IS FINE IT WEARS OFF AROUND 12:30 - 1:00.    PATIENT IS REQUESTING THE DOSE TO BE INCREASED.    PLEASE SEND TO     [facility]/pharmacy #[id number] - [place], [place] - [address] AT [address] FROM [address] - [phone] [facility] [phone] FX

## 2025-03-04 NOTE — TELEPHONE ENCOUNTER
Caller: Jose Alejandro Edwards    Relationship to patient: Self    Best call back number:     [phone]        Chief complaint: ADJUST MEDICATIONS    Type of visit: MYCHART      Additional notes:WANTED TO LET OFFICE KNOW THAT PATIENT CALLED & I SEEN THIS MESSAGE & I MADE HIM A MYCHART VIDEO VISIT FOR 3.6.25

## 2025-03-06 ENCOUNTER — TELEMEDICINE (OUTPATIENT)
Dept: FAMILY MEDICINE CLINIC | Facility: CLINIC | Age: 35
End: 2025-03-06
Payer: COMMERCIAL

## 2025-03-06 DIAGNOSIS — F90.2 ATTENTION DEFICIT HYPERACTIVITY DISORDER (ADHD), COMBINED TYPE: Primary | ICD-10-CM

## 2025-03-06 PROCEDURE — 99213 OFFICE O/P EST LOW 20 MIN: CPT | Performed by: FAMILY MEDICINE

## 2025-03-06 RX ORDER — LISDEXAMFETAMINE DIMESYLATE 50 MG/1
50 CAPSULE ORAL EVERY MORNING
Qty: 30 CAPSULE | Refills: 0 | Status: SHIPPED | OUTPATIENT
Start: 2025-03-06

## 2025-03-22 NOTE — PROGRESS NOTES
Subjective cc: ADHD  Jose Alejandro Edwards is a 34 y.o. male who presents via mychart video visit for adhd follow up.    He is at home, I am in office    He would like to continue on adhd medication - feels it helps him at work   No new concerns     History of Present Illness      Results      The following portions of the patient's history were reviewed and updated as appropriate: allergies, current medications, past family history, past medical history, past social history, past surgical history, and problem list.        A review of systems was performed, and pertinent findings are noted in the HPI.    Objective   There were no vitals taken for this visit.         Physical Exam  Vitals and nursing note reviewed.   Constitutional:       General: He is not in acute distress.     Appearance: Normal appearance.   Pulmonary:      Effort: Pulmonary effort is normal. No respiratory distress.   Neurological:      Mental Status: He is alert and oriented to person, place, and time. Mental status is at baseline.   Psychiatric:         Mood and Affect: Mood normal.         Behavior: Behavior normal.         Thought Content: Thought content normal.         Judgment: Judgment normal.         Assessment & Plan   Problems Addressed this Visit    None  Visit Diagnoses         Attention deficit hyperactivity disorder (ADHD), combined type    -  Primary    Relevant Medications    lisdexamfetamine (Vyvanse) 50 MG capsule          Diagnoses         Codes Comments      Attention deficit hyperactivity disorder (ADHD), combined type    -  Primary ICD-10-CM: F90.2  ICD-9-CM: 314.01             Assessment & Plan        ADHD: chornic, controlled, continue on medication, refill given, controlled contract in chart, UDS UTD     15 minutes    Transcribed from ambient dictation for Sharla Ramachandran MD by Sharla Ramachandran MD.  03/22/25   10:53 CDT    Patient or patient representative verbalized consent for the use of Ambient Listening  during the visit with  Sharla Ramachandran MD for chart documentation. 3/22/2025  10:54 CDT          This document has been electronically signed by Sharla Ramachandran MD on March 22, 2025 10:55 CDT

## 2025-04-02 ENCOUNTER — TELEMEDICINE (OUTPATIENT)
Dept: FAMILY MEDICINE CLINIC | Facility: CLINIC | Age: 35
End: 2025-04-02
Payer: COMMERCIAL

## 2025-04-02 VITALS
HEIGHT: 69 IN | SYSTOLIC BLOOD PRESSURE: 142 MMHG | BODY MASS INDEX: 46.65 KG/M2 | DIASTOLIC BLOOD PRESSURE: 84 MMHG | WEIGHT: 315 LBS

## 2025-04-02 DIAGNOSIS — F90.9 ADULT ADHD: Primary | ICD-10-CM

## 2025-04-02 DIAGNOSIS — F90.2 ATTENTION DEFICIT HYPERACTIVITY DISORDER (ADHD), COMBINED TYPE: ICD-10-CM

## 2025-04-02 DIAGNOSIS — E66.813 CLASS 3 SEVERE OBESITY DUE TO EXCESS CALORIES WITH SERIOUS COMORBIDITY AND BODY MASS INDEX (BMI) OF 50.0 TO 59.9 IN ADULT: Primary | ICD-10-CM

## 2025-04-02 DIAGNOSIS — E66.01 CLASS 3 SEVERE OBESITY DUE TO EXCESS CALORIES WITH SERIOUS COMORBIDITY AND BODY MASS INDEX (BMI) OF 50.0 TO 59.9 IN ADULT: Primary | ICD-10-CM

## 2025-04-02 PROCEDURE — 99214 OFFICE O/P EST MOD 30 MIN: CPT | Performed by: NURSE PRACTITIONER

## 2025-04-02 RX ORDER — LISDEXAMFETAMINE DIMESYLATE 60 MG/1
60 CAPSULE ORAL EVERY MORNING
Qty: 30 CAPSULE | Refills: 0 | Status: SHIPPED | OUTPATIENT
Start: 2025-04-02

## 2025-04-02 NOTE — PROGRESS NOTES
"Chief Complaint  ADHD    Subjective        Jose Alejandro Edwards presents to [facility]  History of Present Illness  History of Present Illness  The patient is a 34-year-old male who presents via telehealth visit for evaluation of ADHD.    He has been diagnosed with attention deficit hyperactivity disorder and has been on Vyvanse for approximately 3 to 4 months. Per Dr. Ramachandran.  He reports that the medication is effective from 8:00 AM to 12:00 PM, after which he experiences a noticeable decrease in its efficacy. This decline in effectiveness is particularly challenging as it coincides with his schoolwork schedule, leading to difficulties in maintaining focus. He is seeking advice on potential adjustments to his treatment plan. 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 He has not experienced any adverse effects such as shakiness or irritability while on this medication. He has 3 pills left and is due for a refill.  Pt says that he and Dr. Ramachandran were working on the dose.  He feels like he needs it increased   I spoke with Dr. Goodman and he will sign the meds.    SOCIAL HISTORY  He does not smoke or vape. He works at [employer].    ALLERGIES  The patient has no known allergies.    MEDICATIONS  Vyvanse    Objective   Vital Signs:  /84 (BP Location: Left arm, Patient Position: Sitting, Cuff Size: Large Adult)   Ht 175.3 cm (69\")   Wt (!) 165 kg (364 lb)   BMI 53.75 kg/m²   Estimated body mass index is 53.75 kg/m² as calculated from the following:    Height as of 2/7/25: 175.3 cm (69\").    Weight as of 2/7/25: 165 kg (364 lb).       Class 3 Severe Obesity (BMI >=40). Obesity-related health conditions include the following: none. Obesity is unchanged. BMI is is above average; BMI management plan is completed. We discussed portion control and " increasing exercise.      Physical Exam   Constitutional: He is oriented to person, place, and time. He appears well-developed and well-nourished.   HENT:   Head: Normocephalic and atraumatic.   Right Ear: External ear normal.   Left Ear: External ear normal.   Nose: Nose normal.   Mouth/Throat: Mouth/Lips are normal.Oropharynx is clear and moist.   Eyes: Pupils are equal, round, and reactive to light.   Pulmonary/Chest: Effort normal.   Abdominal: Abdomen appears normal.   Neurological: He is alert and oriented to person, place, and time. He has normal strength.   Skin: Skin is warm and dry. Capillary refill takes 2 to 3 seconds. Turgor is normal.   Psychiatric: He has a normal mood and affect. His speech is normal and behavior is normal.     Physical Exam  Vital Signs  Blood pressure is 142/84.    Result Review :          Results             Assessment and Plan     Diagnoses and all orders for this visit:    1. Class 3 severe obesity due to excess calories with serious comorbidity and body mass index (BMI) of 50.0 to 59.9 in adult (Primary)    2. Attention deficit hyperactivity disorder (ADHD), combined type    Increase vyvanse to 60 mg daily.  Dr. Goodman to sign   Assessment & Plan  1. Attention deficit hyperactivity disorder.  He reports that his current medication, Vyvanse, wears off by 2:00 PM, which affects his ability to focus on schoolwork and other activities. He has been on Vyvanse for approximately 3-4 months and has not experienced any side effects such as shakiness or irritability. His blood pressure reading today was 142/84. A prescription refill for Vyvanse will be sent to [facility]. An attempt will be made to increase the dosage to better manage his symptoms. If the increase is not possible, he will continue with the current dosage.      ICD-10-CM ICD-9-CM   1. Class 3 severe obesity due to excess calories with serious comorbidity and body mass index (BMI) of 50.0 to 59.9 in adult  E66.813  278.01    Z68.43 V85.43    E66.01    2. Attention deficit hyperactivity disorder (ADHD), combined type  F90.2 314.01                Follow Up     Return in about 1 month (around 5/2/2025) for  recheck Dr. sutton .  Patient was given instructions and counseling regarding his condition or for health maintenance advice. Please see specific information pulled into the AVS if appropriate.       Patient or patient representative verbalized consent for the use of Ambient Listening during the visit with  Nakia Encarnacion DNP, JUDE for chart documentation. 4/2/2025  10:36 CDT    Electronically signed by Nakia Encarnacion DNP, JUDE, 04/02/25, 10:36 AM CDT.

## 2025-04-02 NOTE — TELEPHONE ENCOUNTER
Patient seen today for NP. Increased dose of vyvanse pended.     Rx Refill Note  Requested Prescriptions     Pending Prescriptions Disp Refills    lisdexamfetamine (Vyvanse) 60 MG capsule 30 capsule 0     Sig: Take 1 capsule by mouth Every Morning      Last office visit with prescribing clinician: Visit date not found   Last telemedicine visit with prescribing clinician: 4/2/2025   Next office visit with prescribing clinician: Visit date not found                         Would you like a call back once the refill request has been completed: [] Yes [] No    If the office needs to give you a call back, can they leave a voicemail: [] Yes [] No    Cheri Quevedo LPN  04/02/25, 10:38 CDT

## 2025-04-28 DIAGNOSIS — F90.9 ADULT ADHD: ICD-10-CM

## 2025-04-28 RX ORDER — LISDEXAMFETAMINE DIMESYLATE 60 MG/1
60 CAPSULE ORAL EVERY MORNING
Qty: 30 CAPSULE | Refills: 0 | Status: SHIPPED | OUTPATIENT
Start: 2025-04-28

## 2025-04-28 NOTE — TELEPHONE ENCOUNTER
"  Caller: RgaceJose Alejandro hernandez \"Ge\"    Relationship: Self    Best call back number: [phone]     Requested Prescriptions:   Requested Prescriptions     Pending Prescriptions Disp Refills    lisdexamfetamine (Vyvanse) 60 MG capsule 30 capsule 0     Sig: Take 1 capsule by mouth Every Morning        Pharmacy where request should be sent: [id number] [place] [place] - [address] AT [address] FROM [facility] [phone] [facility] [phone]      Last office visit with prescribing clinician: 2/7/2025   Last telemedicine visit with prescribing clinician: 4/2/2025   Next office visit with prescribing clinician: 5/5/2025     Additional details provided by patient:     Does the patient have less than a 3 day supply:  [x] Yes  [] No    Would you like a call back once the refill request has been completed: [x] Yes [] No    If the office needs to give you a call back, can they leave a voicemail: [x] Yes [] No    Mayi Germain Rep   04/28/25 09:22 CDT         "

## 2025-04-28 NOTE — TELEPHONE ENCOUNTER
UDS 8/20/24  CC needed @ next visit   Rx Refill Note  Requested Prescriptions     Pending Prescriptions Disp Refills    lisdexamfetamine (Vyvanse) 60 MG capsule 30 capsule 0     Sig: Take 1 capsule by mouth Every Morning      Last office visit with prescribing clinician: 2/7/2025   Last telemedicine visit with prescribing clinician: 4/2/2025   Next office visit with prescribing clinician: 5/5/2025       Fabiola Galindo MA  04/28/25, 09:29 CDT

## 2025-05-05 ENCOUNTER — OFFICE VISIT (OUTPATIENT)
Dept: FAMILY MEDICINE CLINIC | Facility: CLINIC | Age: 35
End: 2025-05-05
Payer: COMMERCIAL

## 2025-05-05 VITALS
RESPIRATION RATE: 18 BRPM | SYSTOLIC BLOOD PRESSURE: 137 MMHG | HEIGHT: 69 IN | HEART RATE: 97 BPM | OXYGEN SATURATION: 99 % | BODY MASS INDEX: 46.65 KG/M2 | WEIGHT: 315 LBS | DIASTOLIC BLOOD PRESSURE: 87 MMHG | TEMPERATURE: 99 F

## 2025-05-05 DIAGNOSIS — F90.9 ADULT ADHD: Primary | ICD-10-CM

## 2025-05-05 DIAGNOSIS — I10 PRIMARY HYPERTENSION: ICD-10-CM

## 2025-05-05 DIAGNOSIS — E66.01 CLASS 3 SEVERE OBESITY DUE TO EXCESS CALORIES WITH SERIOUS COMORBIDITY AND BODY MASS INDEX (BMI) OF 50.0 TO 59.9 IN ADULT: ICD-10-CM

## 2025-05-05 DIAGNOSIS — E66.813 CLASS 3 SEVERE OBESITY DUE TO EXCESS CALORIES WITH SERIOUS COMORBIDITY AND BODY MASS INDEX (BMI) OF 50.0 TO 59.9 IN ADULT: ICD-10-CM

## 2025-05-05 DIAGNOSIS — G47.33 OSA ON CPAP: ICD-10-CM

## 2025-05-05 PROCEDURE — 99214 OFFICE O/P EST MOD 30 MIN: CPT | Performed by: FAMILY MEDICINE

## 2025-05-05 RX ORDER — LISINOPRIL 10 MG/1
10 TABLET ORAL DAILY
Qty: 30 TABLET | Refills: 2 | Status: SHIPPED | OUTPATIENT
Start: 2025-05-05

## 2025-05-05 NOTE — PROGRESS NOTES
"Subjective   Jose Alejandro Edwards is a 35 y.o. male.     History of Present Illness  The patient presents for a checkup.    He reports an overall improvement in his condition, attributing this to the efficacy of his current medication regimen. He has been on Vyvanse 60 mg for ADHD, which was refilled last week. He is doing well at school and job. He is currently on administrative leave with pay.    He has been experiencing elevated blood pressure, which he attributes to stress following a traumatic event approximately 2 weeks ago. This incident led to his admission at [facility]. He has not previously been treated for hypertension.    He has been diagnosed with sleep apnea and has undergone a sleep study. He is considering the use of weight loss medications such as Zepbound, Ozempic, Wegovy, or Mounjaro, but expresses apprehension due to his wife's adverse reactions to Ozempic. He also mentions a fear of needles. He has initiated lifestyle modifications, including increased physical activity through walking and portion control in his diet. He reports a significant weight loss of nearly 20 pounds since his last visit. He has no history of pancreatitis or thyroid cancer.    He has recently started experiencing anxiety, a new symptom for him, and is currently under the care of a counselor named Liset.    FAMILY HISTORY  There is no family history of thyroid cancer.    Results      The following portions of the patient's history were reviewed and updated as appropriate: allergies, current medications, past family history, past medical history, past social history, past surgical history, and problem list.        A review of systems was performed, and pertinent findings are noted in the HPI.    Objective   /87 (BP Location: Left arm, Patient Position: Sitting, Cuff Size: Large Adult)   Pulse 97   Temp 99 °F (37.2 °C) (Infrared)   Resp 18   Ht 175.3 cm (69\")   Wt (!) 157 kg (346 lb)   SpO2 " 99%   BMI 51.10 kg/m²          Physical Exam  Vitals and nursing note reviewed.   Constitutional:       General: He is not in acute distress.     Appearance: He is well-developed. He is obese. He is not diaphoretic.   HENT:      Head: Normocephalic and atraumatic.      Right Ear: External ear normal.      Left Ear: External ear normal.      Nose: Nose normal.   Eyes:      General:         Right eye: No discharge.         Left eye: No discharge.      Conjunctiva/sclera: Conjunctivae normal.   Neck:      Thyroid: No thyromegaly.      Trachea: No tracheal deviation.   Cardiovascular:      Rate and Rhythm: Regular rhythm. Tachycardia present.      Heart sounds: Normal heart sounds.   Pulmonary:      Effort: Pulmonary effort is normal. No respiratory distress.      Breath sounds: Normal breath sounds. No stridor. No wheezing.   Chest:      Chest wall: No tenderness.   Musculoskeletal:         General: Normal range of motion.      Cervical back: Normal range of motion.   Lymphadenopathy:      Cervical: No cervical adenopathy.   Skin:     General: Skin is warm and dry.   Neurological:      Mental Status: He is alert and oriented to person, place, and time.      Motor: No abnormal muscle tone.      Coordination: Coordination normal.   Psychiatric:         Behavior: Behavior normal.         Thought Content: Thought content normal.         Judgment: Judgment normal.         Assessment & Plan   Problems Addressed this Visit          Endocrine and Metabolic    Class 3 severe obesity due to excess calories with serious comorbidity and body mass index (BMI) of 50.0 to 59.9 in adult    Relevant Medications    Tirzepatide-Weight Management (ZEPBOUND) 2.5 MG/0.5ML solution auto-injector       Sleep    HARSH on CPAP    Relevant Medications    Tirzepatide-Weight Management (ZEPBOUND) 2.5 MG/0.5ML solution auto-injector     Other Visit Diagnoses         Adult ADHD    -  Primary    Relevant Medications    Tirzepatide-Weight Management  (ZEPBOUND) 2.5 MG/0.5ML solution auto-injector      Primary hypertension        Relevant Medications    lisinopril (PRINIVIL,ZESTRIL) 10 MG tablet          Diagnoses         Codes Comments      Adult ADHD    -  Primary ICD-10-CM: F90.9  ICD-9-CM: 314.01       Primary hypertension     ICD-10-CM: I10  ICD-9-CM: 401.9       Class 3 severe obesity due to excess calories with serious comorbidity and body mass index (BMI) of 50.0 to 59.9 in adult     ICD-10-CM: E66.813, Z68.43, E66.01  ICD-9-CM: 278.01, V85.43       HARSH on CPAP     ICD-10-CM: G47.33  ICD-9-CM: 327.23             Assessment & Plan  1. Attention Deficit Hyperactivity Disorder (ADHD).  - The current medication regimen of Vyvanse 60 mg appears to be effective in managing ADHD symptoms.  - Blood pressure was significantly elevated at 173/89 during this visit, compared to previous readings in the 140s over 80s.  - Discussed the potential side effect of Vyvanse contributing to elevated blood pressure and the need for re-evaluation.  - If blood pressure remains high, lisinopril 10 mg once daily will be initiated to manage hypertension.    2. Hypertension.  - Blood pressure was significantly elevated at 173/89 during this visit.  - Previous readings were in the 140s over 80s.  - Discussed the potential side effect of Vyvanse contributing to elevated blood pressure and the need for re-evaluation.  - If blood pressure remains elevated, lisinopril 10 mg once daily will be prescribed.    3. Obesity.  - Diagnosed with sleep apnea and has undergone a sleep study.  - Last blood test did not indicate diabetes.  - Discussed the use of Zepbound for weight loss, including administration, dosage, and potential side effects such as nausea and vomiting.  - Prescription for Zepbound will be sent to pharmacy, pending insurance approval. Advised to continue with lifestyle modifications, including increased physical activity and portion control in diet.    Follow-up  - Scheduled  for a follow-up visit in 3 months, or earlier if necessary.               Transcribed from ambient dictation for Sharla Ramachandran MD by Sharla Ramachandran MD.  05/05/25   10:38 CDT    Patient or patient representative verbalized consent for the use of Ambient Listening during the visit with  Sharla Ramachandran MD for chart documentation. 5/5/2025  10:38 CDT          This document has been electronically signed by Sharla Ramachandran MD on May 5, 2025 16:50 CDT      Answers submitted by the patient for this visit:  Problem not listed (Submitted on 4/28/2025)  Chief Complaint: Other medical problem  Reason for appointment: Medicine update  abdominal pain: No  anorexia: No  joint pain: No  change in stool: No  chest pain: No  chills: No  nasal congestion: No  cough: No  diaphoresis: No  fatigue: No  fever: No  headaches: No  joint swelling: No  myalgias: No  nausea: No  neck pain: No  numbness: No  rash: No  sore throat: No  swollen glands: No  dysuria: No  vertigo: No  visual change: No  vomiting: No  weakness: No  Onset: at an unknown time

## 2025-05-06 ENCOUNTER — TELEPHONE (OUTPATIENT)
Dept: FAMILY MEDICINE CLINIC | Facility: CLINIC | Age: 35
End: 2025-05-06

## 2025-05-06 NOTE — TELEPHONE ENCOUNTER
I attempted to submit PA for pt- Zepbound is not covered by pt insurance. Please advise of alternative

## 2025-05-13 ENCOUNTER — TELEPHONE (OUTPATIENT)
Dept: FAMILY MEDICINE CLINIC | Facility: CLINIC | Age: 35
End: 2025-05-13
Payer: COMMERCIAL

## 2025-05-13 DIAGNOSIS — E66.01 CLASS 3 SEVERE OBESITY DUE TO EXCESS CALORIES WITH SERIOUS COMORBIDITY AND BODY MASS INDEX (BMI) OF 50.0 TO 59.9 IN ADULT: Primary | ICD-10-CM

## 2025-05-13 DIAGNOSIS — G47.33 OSA ON CPAP: ICD-10-CM

## 2025-05-13 DIAGNOSIS — E66.813 CLASS 3 SEVERE OBESITY DUE TO EXCESS CALORIES WITH SERIOUS COMORBIDITY AND BODY MASS INDEX (BMI) OF 50.0 TO 59.9 IN ADULT: Primary | ICD-10-CM

## 2025-05-13 NOTE — TELEPHONE ENCOUNTER
Patient called asking for an alternate for Zepbound since insurance still denied after the PA was complete

## 2025-05-14 RX ORDER — METFORMIN HYDROCHLORIDE 500 MG/1
500 TABLET, EXTENDED RELEASE ORAL
Qty: 90 TABLET | Refills: 0 | Status: SHIPPED | OUTPATIENT
Start: 2025-05-14

## 2025-05-15 ENCOUNTER — TELEMEDICINE (OUTPATIENT)
Dept: FAMILY MEDICINE CLINIC | Facility: CLINIC | Age: 35
End: 2025-05-15
Payer: COMMERCIAL

## 2025-05-15 ENCOUNTER — TELEPHONE (OUTPATIENT)
Dept: FAMILY MEDICINE CLINIC | Facility: CLINIC | Age: 35
End: 2025-05-15

## 2025-05-15 DIAGNOSIS — I10 PRIMARY HYPERTENSION: ICD-10-CM

## 2025-05-15 DIAGNOSIS — F41.9 ANXIETY: Primary | ICD-10-CM

## 2025-05-15 DIAGNOSIS — E66.813 CLASS 3 SEVERE OBESITY DUE TO EXCESS CALORIES WITH SERIOUS COMORBIDITY AND BODY MASS INDEX (BMI) OF 50.0 TO 59.9 IN ADULT: ICD-10-CM

## 2025-05-15 DIAGNOSIS — E66.01 CLASS 3 SEVERE OBESITY DUE TO EXCESS CALORIES WITH SERIOUS COMORBIDITY AND BODY MASS INDEX (BMI) OF 50.0 TO 59.9 IN ADULT: ICD-10-CM

## 2025-05-15 DIAGNOSIS — F90.9 ADULT ADHD: ICD-10-CM

## 2025-05-15 PROCEDURE — 99214 OFFICE O/P EST MOD 30 MIN: CPT | Performed by: FAMILY MEDICINE

## 2025-05-15 RX ORDER — LISDEXAMFETAMINE DIMESYLATE 60 MG/1
60 CAPSULE ORAL EVERY MORNING
Qty: 30 CAPSULE | Refills: 0 | Status: SHIPPED | OUTPATIENT
Start: 2025-05-15

## 2025-05-15 RX ORDER — LISINOPRIL 10 MG/1
10 TABLET ORAL DAILY
Qty: 90 TABLET | Refills: 1 | Status: SHIPPED | OUTPATIENT
Start: 2025-05-15

## 2025-05-15 RX ORDER — BUSPIRONE HYDROCHLORIDE 5 MG/1
5 TABLET ORAL 3 TIMES DAILY PRN
Qty: 270 TABLET | Refills: 0 | Status: SHIPPED | OUTPATIENT
Start: 2025-05-15

## 2025-05-15 NOTE — TELEPHONE ENCOUNTER
"  Caller: Jose Alejandro Edwards \"Ge\"    Relationship: Self    Best call back number: [phone]     What is the best time to reach you: ANY    Who are you requesting to speak with (clinical staff, provider,  specific staff member): CLINICAL    Do you know the name of the person who called: SELF    What was the call regarding: WAS ADVISED BY INSURANCE THAT THEY WILL COVER WAGOVY. WANTS TO SEE IF THAT CAN BE PRESCRIBED INSTEAD OF METFORMIN. PLEASE ADVISE IF CAN DO     Is it okay if the provider responds through [facility]: YES OR CALL BACK       "

## 2025-05-15 NOTE — PROGRESS NOTES
Subjective   Jose Alejandro Edwards is a 35 y.o. male who presents via mychart video visit.    He is at home   I am in office     He went to see a counselor and they suggested an anxiety medication to take PRN     He has been suspended from work - he reports his insurance will be on hold at the end of the month until he is re-instated - would like to have 90 day rx for his medications     BP is controlled on current medication   ADHD is stable on current medication   Zebound was too expensive so we have sent in metformin as an alternative to try     History of Present Illness      Results      The following portions of the patient's history were reviewed and updated as appropriate: allergies, current medications, past family history, past medical history, past social history, past surgical history, and problem list.        A review of systems was performed, and pertinent findings are noted in the HPI.    Objective   There were no vitals taken for this visit.         Physical Exam  Constitutional:       General: He is not in acute distress.     Appearance: Normal appearance. He is not toxic-appearing.   HENT:      Nose: Nose normal.   Eyes:      General:         Right eye: No discharge.         Left eye: No discharge.      Conjunctiva/sclera: Conjunctivae normal.   Pulmonary:      Effort: Pulmonary effort is normal. No respiratory distress.   Neurological:      Mental Status: He is oriented to person, place, and time.   Psychiatric:         Mood and Affect: Mood normal.         Behavior: Behavior normal.         Thought Content: Thought content normal.         Judgment: Judgment normal.         Assessment & Plan   Problems Addressed this Visit          Endocrine and Metabolic    Class 3 severe obesity due to excess calories with serious comorbidity and body mass index (BMI) of 50.0 to 59.9 in adult     Other Visit Diagnoses         Anxiety    -  Primary    Relevant Medications    busPIRone (BUSPAR) 5 MG tablet       Primary hypertension        Relevant Medications    lisinopril (PRINIVIL,ZESTRIL) 10 MG tablet      Adult ADHD        Relevant Medications    lisdexamfetamine (Vyvanse) 60 MG capsule    busPIRone (BUSPAR) 5 MG tablet          Diagnoses         Codes Comments      Anxiety    -  Primary ICD-10-CM: F41.9  ICD-9-CM: 300.00       Primary hypertension     ICD-10-CM: I10  ICD-9-CM: 401.9       Adult ADHD     ICD-10-CM: F90.9  ICD-9-CM: 314.01       Class 3 severe obesity due to excess calories with serious comorbidity and body mass index (BMI) of 50.0 to 59.9 in adult     ICD-10-CM: E66.813, Z68.43, E66.01  ICD-9-CM: 278.01, V85.43             Assessment & Plan    Will add buspar PRN - advised on risks/benefits   Will continue lisinopril for HTN   Obesity is uncontrolled - will start metformin   ADHD controlled - will continue vyvanse       15 minutes       Transcribed from ambient dictation for Sharla Ramachandran MD by Sharla Ramachandran MD.  05/15/25   09:08 CDT    Patient or patient representative verbalized consent for the use of Ambient Listening during the visit with  Sharla Ramachandran MD for chart documentation. 5/15/2025  09:09 CDT          This document has been electronically signed by Sharla Ramachandran MD on May 15, 2025 09:18 CDT

## 2025-05-15 NOTE — TELEPHONE ENCOUNTER
I called pt and notified him of the Metformin. Pt stated he called the insurance company today and they covered Wegovy. If this is an option for him to try.

## 2025-05-30 LAB
QT INTERVAL: 378 MS
QTC INTERVAL: 439 MS

## 2025-06-30 DIAGNOSIS — F90.9 ADULT ADHD: ICD-10-CM

## 2025-06-30 NOTE — TELEPHONE ENCOUNTER
UDS 8/2024  CC needed @ next visit  Rx Refill Note  Requested Prescriptions     Pending Prescriptions Disp Refills    lisdexamfetamine (Vyvanse) 60 MG capsule 30 capsule 0     Sig: Take 1 capsule by mouth Every Morning Fill when due      Last office visit with prescribing clinician: 5/5/2025   Last telemedicine visit with prescribing clinician: 5/15/2025   Next office visit with prescribing clinician: 8/5/2025       Fabiola Galindo MA  06/30/25, 08:33 CDT

## 2025-06-30 NOTE — TELEPHONE ENCOUNTER
"    Caller: Jose Alejandro Edwards \"Ge\"    Relationship: Self    Best call back number: [phone]     Requested Prescriptions:     lisdexamfetamine (Vyvanse) 60 MG capsule  60 mg, Every Morning          Pharmacy where request should be sent:  [id number] [place] [place] [facility] [address] [facility] [phone] [facility] [phone]  [phone]     Last office visit with prescribing clinician: 5/5/2025   Last telemedicine visit with prescribing clinician: 5/15/2025   Next office visit with prescribing clinician: 8/5/2025     Additional details provided by patient: NONE     Does the patient have less than a 3 day supply:  [x] Yes  [] No    Would you like a call back once the refill request has been completed: [x] Yes [] No    If the office needs to give you a call back, can they leave a voicemail: [x] Yes [] No    Mayi España   06/30/25 08:30 CDT         "

## 2025-07-01 DIAGNOSIS — F90.9 ADULT ADHD: ICD-10-CM

## 2025-07-01 RX ORDER — LISDEXAMFETAMINE DIMESYLATE 60 MG/1
60 CAPSULE ORAL EVERY MORNING
Qty: 30 CAPSULE | Refills: 0 | Status: SHIPPED | OUTPATIENT
Start: 2025-07-01

## 2025-07-01 NOTE — TELEPHONE ENCOUNTER
"    Hub staff attempted to follow warm transfer process and was unsuccessful     Caller: Jose Alejandro Edwards \"Ge\"    Relationship to patient: Self    Best call back number:   Telephone Information:   Mobile [phone]       Patient is needing: PATIENT CALLING FOR UPDATE ON MEDICATION, ADVISED HE TOOK LAST PILL TODAY, PLEASE FOLLOW UP WITH PATIENT ONCE SUBMITTED, THANKS        "

## 2025-07-02 RX ORDER — LISDEXAMFETAMINE DIMESYLATE 60 MG/1
60 CAPSULE ORAL EVERY MORNING
Qty: 30 CAPSULE | Refills: 0 | OUTPATIENT
Start: 2025-07-02

## 2025-07-02 NOTE — TELEPHONE ENCOUNTER
Duplicate request.     Rx Refill Note  Requested Prescriptions     Pending Prescriptions Disp Refills    lisdexamfetamine (Vyvanse) 60 MG capsule 30 capsule 0     Sig: Take 1 capsule by mouth Every Morning Fill when due      Last office visit with prescribing clinician: 5/5/2025   Last telemedicine visit with prescribing clinician: 5/15/2025   Next office visit with prescribing clinician: 8/5/2025                         Would you like a call back once the refill request has been completed: [] Yes [] No    If the office needs to give you a call back, can they leave a voicemail: [] Yes [] No    Cheri Quevedo LPN  07/02/25, 12:13 CDT

## 2025-07-30 DIAGNOSIS — F90.9 ADULT ADHD: ICD-10-CM

## 2025-08-01 DIAGNOSIS — F90.9 ADULT ADHD: ICD-10-CM

## 2025-08-01 RX ORDER — LISDEXAMFETAMINE DIMESYLATE 60 MG/1
60 CAPSULE ORAL EVERY MORNING
Qty: 30 CAPSULE | Refills: 0 | OUTPATIENT
Start: 2025-08-01

## 2025-08-01 NOTE — TELEPHONE ENCOUNTER
"  Caller: Jose Alejandro Edwards \"Ge\"    Relationship: Self    Best call back number: [phone]     What is the best time to reach you: ANYTIME    Who are you requesting to speak with (clinical staff, provider,  specific staff member): CLINICAL    What was the call regarding: STATUS OF VYVANSE REFILL    Is it okay if the provider responds through [facility]: PLEASE CALL    "

## 2025-08-04 ENCOUNTER — OFFICE VISIT (OUTPATIENT)
Dept: NEUROLOGY | Age: 35
End: 2025-08-04
Payer: MEDICAID

## 2025-08-04 VITALS
OXYGEN SATURATION: 99 % | HEIGHT: 69 IN | HEART RATE: 75 BPM | SYSTOLIC BLOOD PRESSURE: 100 MMHG | BODY MASS INDEX: 46.65 KG/M2 | WEIGHT: 315 LBS | DIASTOLIC BLOOD PRESSURE: 65 MMHG

## 2025-08-04 DIAGNOSIS — G47.33 OBSTRUCTIVE SLEEP APNEA: Primary | ICD-10-CM

## 2025-08-04 DIAGNOSIS — Z99.89 CPAP (CONTINUOUS POSITIVE AIRWAY PRESSURE) DEPENDENCE: ICD-10-CM

## 2025-08-04 PROCEDURE — 99212 OFFICE O/P EST SF 10 MIN: CPT | Performed by: PHYSICIAN ASSISTANT

## 2025-08-04 RX ORDER — BUSPIRONE HYDROCHLORIDE 5 MG/1
5 TABLET ORAL 3 TIMES DAILY PRN
COMMUNITY
Start: 2025-05-15

## 2025-08-04 RX ORDER — LISINOPRIL 10 MG/1
10 TABLET ORAL DAILY
COMMUNITY
Start: 2025-05-15

## 2025-08-04 RX ORDER — LISDEXAMFETAMINE DIMESYLATE 60 MG/1
60 CAPSULE ORAL EVERY MORNING
COMMUNITY
Start: 2025-01-01

## 2025-08-04 RX ORDER — LISDEXAMFETAMINE DIMESYLATE 60 MG/1
60 CAPSULE ORAL EVERY MORNING
Qty: 30 CAPSULE | Refills: 0 | Status: SHIPPED | OUTPATIENT
Start: 2025-08-04

## 2025-08-04 RX ORDER — APREMILAST 30 MG/1
TABLET, FILM COATED ORAL
COMMUNITY
Start: 2024-08-01

## 2025-08-05 ENCOUNTER — OFFICE VISIT (OUTPATIENT)
Dept: FAMILY MEDICINE CLINIC | Facility: CLINIC | Age: 35
End: 2025-08-05
Payer: MEDICAID

## 2025-08-05 VITALS
OXYGEN SATURATION: 98 % | WEIGHT: 315 LBS | SYSTOLIC BLOOD PRESSURE: 137 MMHG | RESPIRATION RATE: 18 BRPM | BODY MASS INDEX: 46.65 KG/M2 | TEMPERATURE: 97.9 F | HEIGHT: 69 IN | HEART RATE: 85 BPM | DIASTOLIC BLOOD PRESSURE: 85 MMHG

## 2025-08-05 DIAGNOSIS — Z79.899 MEDICATION MANAGEMENT: ICD-10-CM

## 2025-08-05 DIAGNOSIS — E66.813 CLASS 3 SEVERE OBESITY DUE TO EXCESS CALORIES WITH SERIOUS COMORBIDITY AND BODY MASS INDEX (BMI) OF 50.0 TO 59.9 IN ADULT: ICD-10-CM

## 2025-08-05 DIAGNOSIS — F41.9 ANXIETY: ICD-10-CM

## 2025-08-05 DIAGNOSIS — I10 PRIMARY HYPERTENSION: Primary | ICD-10-CM

## 2025-08-05 PROCEDURE — 99214 OFFICE O/P EST MOD 30 MIN: CPT | Performed by: FAMILY MEDICINE

## 2025-08-05 PROCEDURE — 1126F AMNT PAIN NOTED NONE PRSNT: CPT | Performed by: FAMILY MEDICINE

## 2025-08-05 RX ORDER — LISINOPRIL 20 MG/1
20 TABLET ORAL DAILY
Qty: 90 TABLET | Refills: 1 | Status: SHIPPED | OUTPATIENT
Start: 2025-08-05

## 2025-08-05 RX ORDER — BUSPIRONE HYDROCHLORIDE 10 MG/1
10 TABLET ORAL 3 TIMES DAILY PRN
Qty: 270 TABLET | Refills: 0 | Status: SHIPPED | OUTPATIENT
Start: 2025-08-05

## 2025-08-05 RX ORDER — METFORMIN HYDROCHLORIDE 500 MG/1
500 TABLET, EXTENDED RELEASE ORAL
Qty: 90 TABLET | Refills: 0 | Status: SHIPPED | OUTPATIENT
Start: 2025-08-05

## 2025-08-12 LAB — DRUGS UR: NORMAL
